# Patient Record
Sex: FEMALE | Race: WHITE | Employment: OTHER | URBAN - METROPOLITAN AREA
[De-identification: names, ages, dates, MRNs, and addresses within clinical notes are randomized per-mention and may not be internally consistent; named-entity substitution may affect disease eponyms.]

---

## 2017-01-14 PROCEDURE — 82656 EL-1 FECAL QUAL/SEMIQ: CPT

## 2017-01-14 PROCEDURE — 82705 FATS/LIPIDS FECES QUAL: CPT

## 2017-01-15 ENCOUNTER — LAB ENCOUNTER (OUTPATIENT)
Dept: LAB | Facility: HOSPITAL | Age: 72
End: 2017-01-15
Attending: INTERNAL MEDICINE
Payer: MEDICARE

## 2017-01-15 DIAGNOSIS — K86.89 PANCREATIC INSUFFICIENCY: ICD-10-CM

## 2017-01-18 LAB
NEUTRAL FAT, FECAL: NORMAL
SPLIT FAT, FECAL: NORMAL

## 2017-01-20 LAB — PANCREATIC ELASTASE , FECAL: 415 UG/G

## 2017-05-04 PROBLEM — Z86.010 HISTORY OF COLONIC POLYPS: Status: ACTIVE | Noted: 2017-05-04

## 2017-05-04 PROBLEM — Z86.0100 HISTORY OF COLONIC POLYPS: Status: ACTIVE | Noted: 2017-05-04

## 2017-08-15 ENCOUNTER — TELEPHONE (OUTPATIENT)
Dept: NEUROLOGY | Facility: CLINIC | Age: 72
End: 2017-08-15

## 2017-08-15 NOTE — TELEPHONE ENCOUNTER
Patients ENT doctor ordered a MRI for the face and neck and was wondering if Dr. Abbey Tenorio would be willing to combine an order of a MRI of the head. The patient has an MRI scheduled at [facility].     Informed patient there are no orders for a recent MRI

## 2017-09-06 ENCOUNTER — OFFICE VISIT (OUTPATIENT)
Dept: NEUROLOGY | Facility: CLINIC | Age: 72
End: 2017-09-06

## 2017-09-06 VITALS
BODY MASS INDEX: 28.95 KG/M2 | DIASTOLIC BLOOD PRESSURE: 76 MMHG | HEART RATE: 78 BPM | HEIGHT: 68 IN | RESPIRATION RATE: 16 BRPM | SYSTOLIC BLOOD PRESSURE: 132 MMHG | WEIGHT: 191 LBS

## 2017-09-06 DIAGNOSIS — R41.3 MEMORY LOSS: Primary | ICD-10-CM

## 2017-09-06 DIAGNOSIS — S06.0X0D CONCUSSION WITHOUT LOSS OF CONSCIOUSNESS, SUBSEQUENT ENCOUNTER: ICD-10-CM

## 2017-09-06 PROCEDURE — 99213 OFFICE O/P EST LOW 20 MIN: CPT | Performed by: OTHER

## 2017-09-06 RX ORDER — SIMVASTATIN 10 MG
TABLET ORAL
COMMUNITY
Start: 2017-08-31 | End: 2019-07-09

## 2017-09-06 RX ORDER — LEVOTHYROXINE SODIUM 0.15 MG/1
TABLET ORAL
COMMUNITY
Start: 2017-08-31 | End: 2019-05-06

## 2017-09-06 RX ORDER — MOMETASONE 50 UG/1
SPRAY, METERED NASAL
COMMUNITY
End: 2021-08-10 | Stop reason: ALTCHOICE

## 2017-09-06 NOTE — PATIENT INSTRUCTIONS
Refill policies:    • Allow 2-3 business days for refills; controlled substances may take longer.   • Contact your pharmacy at least 5 days prior to running out of medication and have them send an electronic request or submit request through the [facility] have a procedure or additional testing performed. [facility]) will contact your insurance carrier to obtain pre-certification or prior authorization.     Unfortunately, NICOLE has seen an increase in denial of payment even though the p

## 2017-09-09 NOTE — PROGRESS NOTES
HPI:    Patient ID: Nara Logan is a 80year old female. HPI      Ms Jeevan Leslie is a 80year old female who presented with complaints of memory problem. She has history of head concussion 2 years ago and had seen me for post concussion headache.  Sh Tab  Disp:  Rfl:    Mometasone Furoate 50 MCG/ACT Nasal Suspension Place 1 Puff in the nose two times per day. Disp:  Rfl:    Sertraline HCl 50 MG Oral Tab 50 mg. Disp:  Rfl:    Sertraline HCl 100 MG Oral Tab Take 100 mg by mouth daily.  Disp:  Rfl:    Lamo encounter    Recommend neuropsychological evaluation. All previous scan have been fine and recently had MRI neck and no intracranial abnormality seen on the available images    Follow up after the test. Continue antidepressant treatment.     See orders and

## 2018-01-08 PROBLEM — S63.631A SPRAIN OF INTERPHALANGEAL JOINT OF LEFT INDEX FINGER, INITIAL ENCOUNTER: Status: ACTIVE | Noted: 2018-01-08

## 2018-01-08 PROBLEM — M19.041 OSTEOARTHRITIS OF FINGER OF RIGHT HAND: Status: ACTIVE | Noted: 2018-01-08

## 2018-05-03 ENCOUNTER — HOSPITAL (OUTPATIENT)
Dept: OTHER | Age: 73
End: 2018-05-03
Attending: ORTHOPAEDIC SURGERY

## 2019-04-26 ENCOUNTER — TELEPHONE (OUTPATIENT)
Dept: NEUROLOGY | Facility: CLINIC | Age: 74
End: 2019-04-26

## 2019-05-06 ENCOUNTER — TELEPHONE (OUTPATIENT)
Dept: NEUROLOGY | Facility: CLINIC | Age: 74
End: 2019-05-06

## 2019-05-06 ENCOUNTER — HOSPITAL ENCOUNTER (OUTPATIENT)
Dept: GENERAL RADIOLOGY | Facility: HOSPITAL | Age: 74
Discharge: HOME | End: 2019-05-06
Attending: Other
Payer: MEDICARE

## 2019-05-06 ENCOUNTER — PATIENT MESSAGE (OUTPATIENT)
Dept: NEUROLOGY | Facility: CLINIC | Age: 74
End: 2019-05-06

## 2019-05-06 ENCOUNTER — OFFICE VISIT (OUTPATIENT)
Dept: NEUROLOGY | Facility: CLINIC | Age: 74
End: 2019-05-06
Payer: MEDICARE

## 2019-05-06 VITALS
HEART RATE: 90 BPM | WEIGHT: 176 LBS | RESPIRATION RATE: 14 BRPM | BODY MASS INDEX: 27 KG/M2 | SYSTOLIC BLOOD PRESSURE: 118 MMHG | DIASTOLIC BLOOD PRESSURE: 68 MMHG

## 2019-05-06 DIAGNOSIS — M54.81 OCCIPITAL NEURALGIA OF LEFT SIDE: ICD-10-CM

## 2019-05-06 DIAGNOSIS — G44.52 NEW DAILY PERSISTENT HEADACHE: ICD-10-CM

## 2019-05-06 DIAGNOSIS — G44.52 NEW DAILY PERSISTENT HEADACHE: Primary | ICD-10-CM

## 2019-05-06 PROCEDURE — 99214 OFFICE O/P EST MOD 30 MIN: CPT | Performed by: OTHER

## 2019-05-06 PROCEDURE — 96372 THER/PROPH/DIAG INJ SC/IM: CPT | Performed by: OTHER

## 2019-05-06 PROCEDURE — 72050 X-RAY EXAM NECK SPINE 4/5VWS: CPT | Performed by: OTHER

## 2019-05-06 RX ORDER — METHYLPHENIDATE HYDROCHLORIDE 20 MG/1
20 TABLET ORAL DAILY
Refills: 0 | COMMUNITY
Start: 2019-04-29 | End: 2019-05-06

## 2019-05-06 RX ORDER — LEVOTHYROXINE SODIUM 137 UG/1
TABLET ORAL
COMMUNITY
Start: 2019-04-25

## 2019-05-06 RX ORDER — HYDROCODONE BITARTRATE AND ACETAMINOPHEN 10; 325 MG/1; MG/1
1-2 TABLET ORAL
COMMUNITY
Start: 2019-03-19 | End: 2019-05-15 | Stop reason: ALTCHOICE

## 2019-05-06 RX ORDER — METHYLPHENIDATE HYDROCHLORIDE 20 MG/1
20 TABLET ORAL DAILY
COMMUNITY
Start: 2019-01-23

## 2019-05-06 RX ORDER — KETOROLAC TROMETHAMINE 30 MG/ML
30 INJECTION, SOLUTION INTRAMUSCULAR; INTRAVENOUS ONCE
Status: COMPLETED | OUTPATIENT
Start: 2019-05-06 | End: 2019-05-06

## 2019-05-06 RX ORDER — METHOCARBAMOL 500 MG/1
500 TABLET, FILM COATED ORAL 2 TIMES DAILY PRN
Qty: 60 TABLET | Refills: 2 | Status: SHIPPED | OUTPATIENT
Start: 2019-05-06 | End: 2021-08-10 | Stop reason: ALTCHOICE

## 2019-05-06 RX ORDER — ZOLPIDEM TARTRATE 10 MG/1
10 TABLET ORAL
COMMUNITY

## 2019-05-06 RX ADMIN — KETOROLAC TROMETHAMINE 30 MG: 30 INJECTION, SOLUTION INTRAMUSCULAR; INTRAVENOUS at 11:30:00

## 2019-05-06 NOTE — PROGRESS NOTES
HPI:    Patient ID: Denise Giron is a 68year old female. HPI   Patient presents for evaluation of headaches. She has previously seen me for memory issues and neuropsychology evaluation is still pending.  She reports in March she underwent parotid Negative. HENT: Negative. Eyes: Negative. Negative for visual disturbance. Respiratory: Negative. Cardiovascular: Negative. Gastrointestinal: Negative. Endocrine: Negative. Genitourinary: Negative. Musculoskeletal: Negative.     Aleksander Grijalva Normocephalic, atraumatic + occipital tenderness in left occipital area  NECK: restricted extension and lateral flexion  Cardiovascular: Normal rate, regular rhythm and normal heart sounds.     Pulmonary/Chest: Effort normal and breath sounds normal.   Abdo [place]          No orders of the defined types were placed in this encounter.       Meds This Visit:  Requested Prescriptions      No prescriptions requested or ordered in this encounter       Imaging & Referrals:  None       QR#[id number]

## 2019-05-06 NOTE — PROGRESS NOTES
The patient was given a Toradol injection today. Patient tolerated the medication well. The patient has no complaints.

## 2019-05-06 NOTE — PROGRESS NOTES
PT states she has had a headache since 04/12/2019 rating pain at 8/10 and states the headaches are incapaitating. PT reports no other symptoms that occur with the headaches.

## 2019-05-07 ENCOUNTER — PATIENT MESSAGE (OUTPATIENT)
Dept: NEUROLOGY | Facility: CLINIC | Age: 74
End: 2019-05-07

## 2019-05-07 ENCOUNTER — TELEPHONE (OUTPATIENT)
Dept: NEUROLOGY | Facility: CLINIC | Age: 74
End: 2019-05-07

## 2019-05-07 RX ORDER — METHYLPREDNISOLONE 4 MG/1
TABLET ORAL
Qty: 1 PACKAGE | Refills: 0 | Status: SHIPPED | OUTPATIENT
Start: 2019-05-07 | End: 2019-05-15 | Stop reason: ALTCHOICE

## 2019-05-07 NOTE — TELEPHONE ENCOUNTER
Patient informed of below. Patient states she has taken 4 Robaxin within a 24 hour period with no relief. Patient states she has had relief in the past with Flexeril and wondering if she can switch back to this.  States she has been taking Ibuprofen/Aleve w

## 2019-05-07 NOTE — TELEPHONE ENCOUNTER
1. Degenerative disc disease, facet hypertrophy and foraminal narrowing seen C3-4, C5-6 and C6-7 levels  2. Curvature straightening consistent with spasm.     Continue Robaxin, may take up to 3 times as needed and if tolerating  Continue NSAID prn for p

## 2019-05-07 NOTE — TELEPHONE ENCOUNTER
From: Lizette Philippe  To: Mehnaz Phipps MD  Sent: 5/7/2019 10:29 AM CDT  Subject: Visit Follow-up Question    Loc, you prescribed Methocarbamol, 500mg one tablet twice a day. I took one last night and one first thing this morning.  I don’t feel

## 2019-05-07 NOTE — TELEPHONE ENCOUNTER
She may need a steroid pack to decrease the neck inflammation. I placed the order.  She would need a dedicated MRI cervical spine for further evaluation and depending upon how severe DJD either spine surgery or pain medicine

## 2019-05-07 NOTE — TELEPHONE ENCOUNTER
From: Lizette Cisse  To:  Rene Jorge MD  Sent: 5/6/2019 3:00 PM CDT  Subject: Visit Follow-up Question    It’s 3 pm May 6, 2019  The Toradol injection has had no effect   I’ve just taken one 500mg Methocarbamol  I’ll let you know if that helps

## 2019-05-08 ENCOUNTER — TELEPHONE (OUTPATIENT)
Dept: NEUROLOGY | Facility: CLINIC | Age: 74
End: 2019-05-08

## 2019-05-08 NOTE — TELEPHONE ENCOUNTER
Spoke with patient and she states she will try the medrol dose clayton, but wants to move forward with the occipital nerve block and possible Trigger point injections. Patient reports that she has been taking 3 grams per day of the Methocarbamol (Robaxin).

## 2019-05-09 ENCOUNTER — TELEPHONE (OUTPATIENT)
Dept: NEUROLOGY | Facility: CLINIC | Age: 74
End: 2019-05-09

## 2019-05-09 DIAGNOSIS — M54.2 NECK PAIN: ICD-10-CM

## 2019-05-09 DIAGNOSIS — M54.81 OCCIPITAL NEURALGIA OF LEFT SIDE: Primary | ICD-10-CM

## 2019-05-09 NOTE — TELEPHONE ENCOUNTER
Per Dr. Gabriela Thomas, patient to come in for TPI and Occiptial Nerve Block. CPT codes as follows;    46726 Occipital Nerve Block  46392 TPI 3 or more muscles  59876 TPI 2 or more muscles    NICOLE other referral placed.

## 2019-05-10 ENCOUNTER — OFFICE VISIT (OUTPATIENT)
Dept: NEUROLOGY | Facility: CLINIC | Age: 74
End: 2019-05-10
Payer: MEDICARE

## 2019-05-10 VITALS
DIASTOLIC BLOOD PRESSURE: 76 MMHG | WEIGHT: 176 LBS | SYSTOLIC BLOOD PRESSURE: 136 MMHG | HEART RATE: 84 BPM | BODY MASS INDEX: 27 KG/M2 | RESPIRATION RATE: 16 BRPM

## 2019-05-10 DIAGNOSIS — M47.22 OSTEOARTHRITIS OF SPINE WITH RADICULOPATHY, CERVICAL REGION: ICD-10-CM

## 2019-05-10 DIAGNOSIS — M54.2 NECK PAIN: ICD-10-CM

## 2019-05-10 DIAGNOSIS — M54.81 OCCIPITAL NEURALGIA OF LEFT SIDE: ICD-10-CM

## 2019-05-10 DIAGNOSIS — G44.52 NEW DAILY PERSISTENT HEADACHE: ICD-10-CM

## 2019-05-10 DIAGNOSIS — M79.18 MYOFASCIAL PAIN SYNDROME, CERVICAL: ICD-10-CM

## 2019-05-10 PROCEDURE — 20553 NJX 1/MLT TRIGGER POINTS 3/>: CPT | Performed by: OTHER

## 2019-05-10 RX ORDER — TRIAMCINOLONE ACETONIDE 40 MG/ML
40 INJECTION, SUSPENSION INTRA-ARTICULAR; INTRAMUSCULAR ONCE
Status: COMPLETED | OUTPATIENT
Start: 2019-05-10 | End: 2019-05-10

## 2019-05-10 RX ORDER — BUPIVACAINE HYDROCHLORIDE 5 MG/ML
4 INJECTION, SOLUTION PERINEURAL ONCE
Status: COMPLETED | OUTPATIENT
Start: 2019-05-10 | End: 2019-05-10

## 2019-05-10 NOTE — PROGRESS NOTES
Patient here for occipital nerve block and trigger point injections. Risk vs benefit discussed and locations identified and shown to the patient.  She has b/l scars in the occipital area from parotid surgery so we will have to avoid the incision area so i

## 2019-05-10 NOTE — PROCEDURES
Indication:  Bilateral occipital neuralgia and trapezial pain/myofascial pain    Procedure: The areas are prepped and cleaned with betadine scrub and alcohol.   Bilateral occipital nerve blocks and trigger point injections in the left upper paracervical mus

## 2019-05-13 ENCOUNTER — TELEPHONE (OUTPATIENT)
Dept: NEUROLOGY | Facility: CLINIC | Age: 74
End: 2019-05-13

## 2019-05-13 DIAGNOSIS — M54.2 ACUTE NECK PAIN: Primary | ICD-10-CM

## 2019-05-13 NOTE — TELEPHONE ENCOUNTER
Pt called to FU on MRI. She states that  said she would order MRI of injection did not work. I told pt that this message is with the nurse who will FU with the Dr to confirm MRI order and call pt back with answer.

## 2019-05-13 NOTE — TELEPHONE ENCOUNTER
Called patient back and she states that at [address] [place] [address] on 05/06/2019, Dr. Daisy Roman stated she would order an MRI if Nerve Block and TPI did not work. Per patient, she is \"in horrible pain\" and would like MRI ordered before she sees pain management.     Routed to

## 2019-05-13 NOTE — TELEPHONE ENCOUNTER
Told Outpatient that I did not see an order for an MRI. Reviewed last OV notes and didn't see that MRI was discussed. Told Outpatient Rep that I would forward conversation to Nurse.   Have Nurse reconfirm that MRI has not been ordered and have nurse call

## 2019-05-14 NOTE — TELEPHONE ENCOUNTER
Called patient and informed her that Dr. Manav Lynne has ordered MRI for patient. Patient states she has the number for central scheduling. Patient denies any further needs at this time. Encouraged patient to call office for any questions or concerns.  Nelly

## 2019-05-15 ENCOUNTER — OFFICE VISIT (OUTPATIENT)
Dept: PAIN CLINIC | Facility: CLINIC | Age: 74
End: 2019-05-15
Payer: MEDICARE

## 2019-05-15 ENCOUNTER — HOSPITAL ENCOUNTER (OUTPATIENT)
Dept: MRI IMAGING | Age: 74
Discharge: HOME | End: 2019-05-15
Attending: Other
Payer: MEDICARE

## 2019-05-15 ENCOUNTER — TELEPHONE (OUTPATIENT)
Dept: NEUROLOGY | Facility: CLINIC | Age: 74
End: 2019-05-15

## 2019-05-15 VITALS — BODY MASS INDEX: 25.01 KG/M2 | OXYGEN SATURATION: 98 % | HEART RATE: 98 BPM | HEIGHT: 68 IN | WEIGHT: 165 LBS

## 2019-05-15 DIAGNOSIS — M54.81 BILATERAL OCCIPITAL NEURALGIA: ICD-10-CM

## 2019-05-15 DIAGNOSIS — M47.812 FACET ARTHROPATHY, CERVICAL: Primary | ICD-10-CM

## 2019-05-15 DIAGNOSIS — M54.2 ACUTE NECK PAIN: ICD-10-CM

## 2019-05-15 PROCEDURE — 99214 OFFICE O/P EST MOD 30 MIN: CPT | Performed by: NURSE PRACTITIONER

## 2019-05-15 PROCEDURE — 72141 MRI NECK SPINE W/O DYE: CPT | Performed by: OTHER

## 2019-05-15 RX ORDER — HYDROCODONE BITARTRATE AND ACETAMINOPHEN 10; 325 MG/1; MG/1
1 TABLET ORAL EVERY 8 HOURS PRN
Qty: 21 TABLET | Refills: 0 | Status: SHIPPED | OUTPATIENT
Start: 2019-05-15 | End: 2021-08-10 | Stop reason: ALTCHOICE

## 2019-05-15 NOTE — PATIENT INSTRUCTIONS
Refill policies:    • Allow 2-3 business days for refills; controlled substances may take longer.   • Contact your pharmacy at least 5 days prior to running out of medication and have them send an electronic request or submit request through the “request re been approved by your insurer. Depending on your insurance carrier, approval may take 3-10 days. It is highly recommended patients contact their insurance carrier directly to determine coverage.   If test is done without insurance authorization, patient ma SEDATION    Appointment Date: 5/20/19 and 5/29/19  Check-In Time: 10:30am and 10:00am      ** TO AVOID CANCELLATION AND/OR RESCHEDULING: PLEASE CALL [facility] PRE-PROCEDURE LINE -[phone] FOR DETAILED INSTRUCTIONS FIVE TO SEVEN DAYS PRIOR TO PROCEDURE** (Clopidogrel)  • Epidural ____ - 10 days  • Others 7 days   NSAIDs: 24 hours    • Ibuprofen (Motrin, Advil, Vicoprofen), Naproxen (Naprosyn, Aleve), Piroxcam (Feldene), Meloxicam (Mobic), Oxaprozin (Daypro), Diclofenac (Voltaren),  Indomethacin (Indocin), medication, or steroid into the facet joints. The facet joints are part of the bony framework of the spine. They are small bony projections from one vertebra meeting with similar bony projections from the vertebra above or below.  Sometimes, due to a variet their stomach. All patients receiving sedation are monitored with EKG, blood pressure cuff and oxygen monitoring device. Patients not receiving sedation are monitored as needed.  The skin of the neck or back is cleaned with antiseptic solution and numbed wi predict how helpful injections will be. Generally, patients who have the symptoms described above will do well. However, since there are several pain generators in the spine, the degree of response will vary widely. What are the risks and side effects?   Zack Wiggins

## 2019-05-15 NOTE — TELEPHONE ENCOUNTER
Spoke with pt, relayed test results. Addressed any concerns. Pt instructed to take prescribed medication as instructed. Pt verbalized understanding.  Encouraged pt to call office for any further questions.     ----- Message from Howie Davis MD sent at

## 2019-05-15 NOTE — PROGRESS NOTES
Spoke with patient and scheduled injections. Reviewed pre-op instructions. Patient verbalized understanding, no further questions at this time. Pre-op instructions given to patient.

## 2019-05-15 NOTE — H&P
Name: Luis Donohue   : 1945   DOS: 5/15/2019     Chief complaint: Patient presents with:  New Patient: recently completed medrol pack, did not experience relief. Head and neck.        History of present illness:  Lizette Marquez is a 68 Multiple Vitamins-Minerals (SENIOR MULTIVITAMIN PLUS OR) Take  by mouth. Disp:  Rfl:    HYDROcodone-acetaminophen  MG Oral Tab Take 1 tablet by mouth every 8 (eight) hours as needed for Pain.  Disp: 21 tablet Rfl: 0   Zolpidem Tartrate (AMBIEN) 10 M constipation, diarrhea, symptoms of GI bleeding. Neurologic: Negative. Specifically denies any fainting, head injury, LOC, weakness, tremor, headaches, seizures, speech disorders, loss of balance or any  other neurologic problems.   Genitourinary:  Denies left foraminal narrowing. C4-C5:  Trace posterior disc osteophyte complex with severe right and moderate left facet and uncinate hypertrophy. No spinal canal stenosis. Mild bilateral foraminal narrowing.      C5-C6:  Mild posterior disc osteophyte com types were placed in this encounter.       Medications filled today:  Requested Prescriptions     Signed Prescriptions Disp Refills   • HYDROcodone-acetaminophen  MG Oral Tab 21 tablet 0     Sig: Take 1 tablet by mouth every 8 (eight) hours as needed

## 2019-05-20 ENCOUNTER — HOSPITAL ENCOUNTER (OUTPATIENT)
Facility: HOSPITAL | Age: 74
Setting detail: HOSPITAL OUTPATIENT SURGERY
Discharge: HOME | End: 2019-05-20
Attending: ANESTHESIOLOGY | Admitting: ANESTHESIOLOGY
Payer: MEDICARE

## 2019-05-20 ENCOUNTER — APPOINTMENT (OUTPATIENT)
Dept: GENERAL RADIOLOGY | Facility: HOSPITAL | Age: 74
End: 2019-05-20
Attending: ANESTHESIOLOGY
Payer: MEDICARE

## 2019-05-20 VITALS
TEMPERATURE: 98 F | HEART RATE: 67 BPM | DIASTOLIC BLOOD PRESSURE: 70 MMHG | SYSTOLIC BLOOD PRESSURE: 141 MMHG | RESPIRATION RATE: 13 BRPM | OXYGEN SATURATION: 100 %

## 2019-05-20 DIAGNOSIS — M47.812 FACET ARTHROPATHY, CERVICAL: ICD-10-CM

## 2019-05-20 PROCEDURE — 3E0U33Z INTRODUCTION OF ANTI-INFLAMMATORY INTO JOINTS, PERCUTANEOUS APPROACH: ICD-10-PCS | Performed by: ANESTHESIOLOGY

## 2019-05-20 RX ORDER — DEXAMETHASONE SODIUM PHOSPHATE 10 MG/ML
INJECTION, SOLUTION INTRAMUSCULAR; INTRAVENOUS AS NEEDED
Status: DISCONTINUED | OUTPATIENT
Start: 2019-05-20 | End: 2019-05-20

## 2019-05-20 RX ORDER — ONDANSETRON 2 MG/ML
4 INJECTION INTRAMUSCULAR; INTRAVENOUS ONCE AS NEEDED
Status: CANCELLED | OUTPATIENT
Start: 2019-05-20 | End: 2019-05-20

## 2019-05-20 RX ORDER — DIPHENHYDRAMINE HYDROCHLORIDE 50 MG/ML
50 INJECTION INTRAMUSCULAR; INTRAVENOUS ONCE AS NEEDED
Status: CANCELLED | OUTPATIENT
Start: 2019-05-20 | End: 2019-05-20

## 2019-05-20 RX ORDER — 0.9 % SODIUM CHLORIDE 0.9 %
VIAL (ML) INJECTION AS NEEDED
Status: DISCONTINUED | OUTPATIENT
Start: 2019-05-20 | End: 2019-05-20

## 2019-05-20 RX ORDER — LIDOCAINE HYDROCHLORIDE 10 MG/ML
INJECTION, SOLUTION EPIDURAL; INFILTRATION; INTRACAUDAL; PERINEURAL AS NEEDED
Status: DISCONTINUED | OUTPATIENT
Start: 2019-05-20 | End: 2019-05-20

## 2019-05-20 RX ORDER — SODIUM CHLORIDE, SODIUM LACTATE, POTASSIUM CHLORIDE, CALCIUM CHLORIDE 600; 310; 30; 20 MG/100ML; MG/100ML; MG/100ML; MG/100ML
100 INJECTION, SOLUTION INTRAVENOUS CONTINUOUS
Status: DISCONTINUED | OUTPATIENT
Start: 2019-05-20 | End: 2019-05-20

## 2019-05-20 RX ORDER — ONDANSETRON 2 MG/ML
4 INJECTION INTRAMUSCULAR; INTRAVENOUS ONCE AS NEEDED
Status: DISCONTINUED | OUTPATIENT
Start: 2019-05-20 | End: 2019-05-20

## 2019-05-20 NOTE — OPERATIVE REPORT
[facility]  Operative Report  2019     Lizette Fagan Patient Status:  Hospital Outpatient Surgery    1945 MRN [id number]   [facility] ENDOSCOPY Attending Sindy Hernandes MD   Hosp Day # 0 PCP Claribel Mcdnoald complications were encountered during or immediately after the procedure. The patient was observed until discharge criteria met. Discharge instructions were given and patient was released to a responsible adult. Complications: None. Follow up:  In

## 2019-05-20 NOTE — H&P
History & Physical Examination    Patient Name: Yessenia Flores  MRN: [id number]  [facility]: [id number]  YOB: 1945    Pre-Operative Diagnosis:  Facet arthropathy, cervical [M47.812]    Present Illness: Patient with neck pain here for cervical f History   Problem Relation Age of Onset   • Cancer Father         lung cancer   • Heart Disease Father    • High Blood Pressure Father    • Other (Other) Father         TB   • Heart Disease Mother    • Cancer Mother         leukemia   • Glaucoma Mother

## 2019-05-21 ENCOUNTER — TELEPHONE (OUTPATIENT)
Dept: PAIN CLINIC | Facility: CLINIC | Age: 74
End: 2019-05-21

## 2019-05-21 DIAGNOSIS — M47.812 ARTHROPATHY OF CERVICAL FACET JOINT: Primary | ICD-10-CM

## 2019-05-22 NOTE — TELEPHONE ENCOUNTER
Attempted call yesterday evening to patient. LM informing patient call back would be given. Reached out to patient this AM to discuss patient recent visit for injection with Dr. Concha Cha.   Pt states she had edited discussion with Renita Vann, Patient advoc

## 2019-05-23 ENCOUNTER — TELEPHONE (OUTPATIENT)
Dept: SURGERY | Facility: CLINIC | Age: 74
End: 2019-05-23

## 2019-05-23 NOTE — TELEPHONE ENCOUNTER
Spoke to patient, confirmed procedure date of 5/29/19 and to be checked in at outpatient registration at 10:00 am. Patient instructed to call pre-procedure line before procedure at [phone].  Patient instructed to call office if there are additional que

## 2019-05-29 ENCOUNTER — APPOINTMENT (OUTPATIENT)
Dept: GENERAL RADIOLOGY | Facility: HOSPITAL | Age: 74
End: 2019-05-29
Attending: ANESTHESIOLOGY
Payer: MEDICARE

## 2019-05-29 ENCOUNTER — HOSPITAL ENCOUNTER (OUTPATIENT)
Facility: HOSPITAL | Age: 74
Setting detail: HOSPITAL OUTPATIENT SURGERY
Discharge: HOME | End: 2019-05-29
Attending: ANESTHESIOLOGY | Admitting: ANESTHESIOLOGY
Payer: MEDICARE

## 2019-05-29 VITALS
SYSTOLIC BLOOD PRESSURE: 100 MMHG | TEMPERATURE: 98 F | OXYGEN SATURATION: 100 % | RESPIRATION RATE: 20 BRPM | HEART RATE: 67 BPM | DIASTOLIC BLOOD PRESSURE: 80 MMHG

## 2019-05-29 DIAGNOSIS — M47.812 ARTHROPATHY OF CERVICAL FACET JOINT: ICD-10-CM

## 2019-05-29 PROCEDURE — 3E0U33Z INTRODUCTION OF ANTI-INFLAMMATORY INTO JOINTS, PERCUTANEOUS APPROACH: ICD-10-PCS | Performed by: ANESTHESIOLOGY

## 2019-05-29 RX ORDER — ONDANSETRON 2 MG/ML
4 INJECTION INTRAMUSCULAR; INTRAVENOUS ONCE AS NEEDED
Status: DISCONTINUED | OUTPATIENT
Start: 2019-05-29 | End: 2019-05-29

## 2019-05-29 RX ORDER — DIPHENHYDRAMINE HYDROCHLORIDE 50 MG/ML
50 INJECTION INTRAMUSCULAR; INTRAVENOUS ONCE AS NEEDED
Status: DISCONTINUED | OUTPATIENT
Start: 2019-05-29 | End: 2019-05-29

## 2019-05-29 RX ORDER — DEXAMETHASONE SODIUM PHOSPHATE 10 MG/ML
INJECTION, SOLUTION INTRAMUSCULAR; INTRAVENOUS AS NEEDED
Status: DISCONTINUED | OUTPATIENT
Start: 2019-05-29 | End: 2019-05-29

## 2019-05-29 RX ORDER — SODIUM CHLORIDE, SODIUM LACTATE, POTASSIUM CHLORIDE, CALCIUM CHLORIDE 600; 310; 30; 20 MG/100ML; MG/100ML; MG/100ML; MG/100ML
100 INJECTION, SOLUTION INTRAVENOUS CONTINUOUS
Status: DISCONTINUED | OUTPATIENT
Start: 2019-05-29 | End: 2019-05-29

## 2019-05-29 RX ORDER — LIDOCAINE HYDROCHLORIDE 10 MG/ML
INJECTION, SOLUTION EPIDURAL; INFILTRATION; INTRACAUDAL; PERINEURAL AS NEEDED
Status: DISCONTINUED | OUTPATIENT
Start: 2019-05-29 | End: 2019-05-29

## 2019-05-29 RX ORDER — 0.9 % SODIUM CHLORIDE 0.9 %
VIAL (ML) INJECTION AS NEEDED
Status: DISCONTINUED | OUTPATIENT
Start: 2019-05-29 | End: 2019-05-29

## 2019-05-29 NOTE — OPERATIVE REPORT
[facility]  Operative Report  2019     Lizette Urbano Patient Status:  Hospital Outpatient Surgery    1945 MRN [id number]   [facility] ENDOSCOPY Attending Josr Weir MD   Hosp Day # 0 JENNA Osorio complications were encountered during or immediately after the procedure. The patient was observed until discharge criteria met. Discharge instructions were given and patient was released to a responsible adult. Complications: None.     Follow up: Cl

## 2019-05-29 NOTE — H&P
History & Physical Examination    Patient Name: Ana Webb  MRN: [id number]  CSN: [id number]  YOB: 1945    Pre-Operative Diagnosis:  Arthropathy of cervical facet joint [M47.812]    Present Illness: Patient with cervical facet arthro gland removal   • TONSILLECTOMY       Family History   Problem Relation Age of Onset   • Cancer Father         lung cancer   • Heart Disease Father    • High Blood Pressure Father    • Other (Other) Father         TB   • Heart Disease Mother    • Cancer Mo

## 2019-05-30 ENCOUNTER — TELEPHONE (OUTPATIENT)
Dept: NEUROLOGY | Facility: CLINIC | Age: 74
End: 2019-05-30

## 2019-05-30 NOTE — TELEPHONE ENCOUNTER
patient has been seeing Dr. Fanny Noe (Pain mgmt) patient questioned whether she should wait another month or so before coming into see Dr. Litzy Guerra. Patient has appointment with Dr. Fanny Noe on 6/6   Please advise?

## 2019-06-03 NOTE — TELEPHONE ENCOUNTER
Per discussion with Dr. Sheree Braun, since patient has f/u with Dr. Vikki Tavarez, patient can reschedule with Dr. Sheree Braun for a later date. Patient states she would like to cancel appointment and she will call in to reschedule at a later date if needed.  Appointme

## 2019-06-06 ENCOUNTER — OFFICE VISIT (OUTPATIENT)
Dept: PAIN CLINIC | Facility: CLINIC | Age: 74
End: 2019-06-06
Payer: MEDICARE

## 2019-06-06 VITALS — HEIGHT: 68.5 IN | OXYGEN SATURATION: 97 % | WEIGHT: 165 LBS | HEART RATE: 86 BPM | BODY MASS INDEX: 24.72 KG/M2

## 2019-06-06 DIAGNOSIS — M54.2 NECK PAIN: Primary | ICD-10-CM

## 2019-06-06 PROCEDURE — 99213 OFFICE O/P EST LOW 20 MIN: CPT | Performed by: ANESTHESIOLOGY

## 2019-06-06 RX ORDER — GABAPENTIN 100 MG/1
100 CAPSULE ORAL 3 TIMES DAILY
Qty: 90 CAPSULE | Refills: 0 | Status: SHIPPED | OUTPATIENT
Start: 2019-06-06 | End: 2019-06-28

## 2019-06-06 RX ORDER — SIMVASTATIN 20 MG
TABLET ORAL
COMMUNITY
Start: 2019-05-23 | End: 2019-07-09

## 2019-06-06 RX ORDER — SIMVASTATIN 40 MG
TABLET ORAL
COMMUNITY
Start: 2019-06-03

## 2019-06-06 NOTE — PROGRESS NOTES
Name: Yessenia Flores   : 1945   DOS: 2019     Pain Clinic Follow Up Visit:   Patient presents with: Follow - Up      Lizette Simon Rodger is a 68year old female who presents today for follow-up after bilateral cervical facet injections. 60 tablet Rfl: 2         EXAM:   Pulse 86   Ht 68.5\"   Wt 165 lb   SpO2 97%   BMI 24.72 kg/m²   General:  Patient is a(n) 68year old year old female in no acute distress.   Neurologic[de-identified] WNL-Orientation to time, place and person, normal mood & affect, conc

## 2019-06-06 NOTE — PROGRESS NOTES
Patient here to f/u post LEFT CERVICAL FACET INJECTION X4 LEVELS (5/2919), RIGHT CERVICAL FACET INJECTION (5/20/19)      Relief reported: 20-25%     Current pain reported: 4/10    Pain location: middle of neck into top of head almost to forehead, does not

## 2019-06-28 RX ORDER — GABAPENTIN 100 MG/1
CAPSULE ORAL
Qty: 90 CAPSULE | Refills: 0 | Status: SHIPPED | OUTPATIENT
Start: 2019-06-28 | End: 2019-07-09 | Stop reason: DRUGHIGH

## 2019-07-09 ENCOUNTER — OFFICE VISIT (OUTPATIENT)
Dept: PAIN CLINIC | Facility: CLINIC | Age: 74
End: 2019-07-09
Payer: MEDICARE

## 2019-07-09 VITALS
OXYGEN SATURATION: 98 % | HEIGHT: 68.5 IN | WEIGHT: 162 LBS | BODY MASS INDEX: 24.27 KG/M2 | HEART RATE: 86 BPM | DIASTOLIC BLOOD PRESSURE: 74 MMHG | SYSTOLIC BLOOD PRESSURE: 118 MMHG

## 2019-07-09 DIAGNOSIS — M54.2 NECK PAIN: Primary | ICD-10-CM

## 2019-07-09 PROCEDURE — 99213 OFFICE O/P EST LOW 20 MIN: CPT | Performed by: ANESTHESIOLOGY

## 2019-07-09 RX ORDER — GABAPENTIN 300 MG/1
300 CAPSULE ORAL 4 TIMES DAILY
Qty: 120 CAPSULE | Refills: 0 | Status: SHIPPED | OUTPATIENT
Start: 2019-07-09 | End: 2019-07-24

## 2019-07-09 RX ORDER — NAPROXEN SODIUM 220 MG
TABLET ORAL
COMMUNITY

## 2019-07-09 NOTE — PROGRESS NOTES
Patient presents in office today with reported pain in temple through skull to occipital region into middle of cervical spine per pt, behind ears    Current pain level reported = 4.5/10    Last reported dose of Hydrocodone-Acetaminophn  = last week

## 2019-07-09 NOTE — PROGRESS NOTES
Name: Shira Almendarez   : 1945   DOS: 2019     Pain Clinic Follow Up Visit:   Patient presents with:   Follow - Up      Lizette Amaro is a 68year old female with a history of chronic headache and neck pain following up to discuss med Tab Take 10 mg by mouth. Disp:  Rfl:    methocarbamol 500 MG Oral Tab Take 1 tablet (500 mg total) by mouth 2 (two) times daily as needed.  Disp: 60 tablet Rfl: 2         EXAM:   /74 (BP Location: Right arm, Patient Position: Sitting, Cuff Size: adult

## 2019-07-24 RX ORDER — GABAPENTIN 300 MG/1
CAPSULE ORAL
Qty: 120 CAPSULE | Refills: 0 | Status: SHIPPED | OUTPATIENT
Start: 2019-07-24 | End: 2019-08-27

## 2019-08-27 ENCOUNTER — OFFICE VISIT (OUTPATIENT)
Dept: PAIN CLINIC | Facility: CLINIC | Age: 74
End: 2019-08-27
Payer: MEDICARE

## 2019-08-27 VITALS
DIASTOLIC BLOOD PRESSURE: 68 MMHG | HEART RATE: 68 BPM | SYSTOLIC BLOOD PRESSURE: 100 MMHG | HEIGHT: 68.5 IN | BODY MASS INDEX: 26.22 KG/M2 | OXYGEN SATURATION: 98 % | WEIGHT: 175 LBS

## 2019-08-27 DIAGNOSIS — G89.29 CHRONIC NONINTRACTABLE HEADACHE, UNSPECIFIED HEADACHE TYPE: ICD-10-CM

## 2019-08-27 DIAGNOSIS — R51.9 HEADACHE, CHRONIC DAILY: Primary | ICD-10-CM

## 2019-08-27 DIAGNOSIS — R51.9 CHRONIC NONINTRACTABLE HEADACHE, UNSPECIFIED HEADACHE TYPE: ICD-10-CM

## 2019-08-27 PROCEDURE — 99214 OFFICE O/P EST MOD 30 MIN: CPT | Performed by: ANESTHESIOLOGY

## 2019-08-27 RX ORDER — GABAPENTIN 100 MG/1
100 CAPSULE ORAL 2 TIMES DAILY
Qty: 60 CAPSULE | Refills: 1 | Status: SHIPPED | OUTPATIENT
Start: 2019-08-27

## 2019-08-27 RX ORDER — GABAPENTIN 400 MG/1
400 CAPSULE ORAL 4 TIMES DAILY
Qty: 120 CAPSULE | Refills: 1 | Status: SHIPPED | OUTPATIENT
Start: 2019-08-27 | End: 2020-03-11

## 2019-08-27 RX ORDER — GABAPENTIN 100 MG/1
100 CAPSULE ORAL 3 TIMES DAILY
COMMUNITY
End: 2019-08-27

## 2019-08-27 NOTE — PROGRESS NOTES
Name: Charles Pimentel   : 1945   DOS: 2019     Pain Clinic Follow Up Visit:   Patient presents with:   Follow - Up      Lizette Christine Yudith is a 68year old female with a history of chronic daily headache, since facelift procedure here for HYDROcodone-acetaminophen  MG Oral Tab Take 1 tablet by mouth every 8 (eight) hours as needed for Pain. Disp: 21 tablet Rfl: 0   Zolpidem Tartrate (AMBIEN) 10 MG Oral Tab Take 10 mg by mouth.  Disp:  Rfl:    methocarbamol 500 MG Oral Tab Take 1 tab amount of time discussing her recent hospitalization, medications trialed, medication side effects, scar injections, acupuncture, and other treatment modalities.     Radiology orders and consultations:AC[place]UNCTURE [place] - INTERNAL REFERRAL  The patient

## 2019-08-27 NOTE — PROGRESS NOTES
Patient presents in office today with reported pain in forehead, cervical spine and on top of head. Daily headaches.     Current pain level reported = 5/10    Last reported dose of HYDROcodone-acetaminophen  MG Oral Tab = 6 Weeks ago    Patient would

## 2019-08-28 ENCOUNTER — OFFICE VISIT (OUTPATIENT)
Dept: INTEGRATIVE MEDICINE | Facility: CLINIC | Age: 74
End: 2019-08-28

## 2019-08-28 DIAGNOSIS — G89.29 CHRONIC NONINTRACTABLE HEADACHE, UNSPECIFIED HEADACHE TYPE: ICD-10-CM

## 2019-08-28 DIAGNOSIS — R51.9 CHRONIC NONINTRACTABLE HEADACHE, UNSPECIFIED HEADACHE TYPE: ICD-10-CM

## 2019-08-28 NOTE — PROGRESS NOTES
Shirin Malagon   Integrative Medicine          Initial Intake    Patient reports feeling persistent headaches that have evolved from the back of the head and move to the center of the head and forehead.  Level=8  The headaches limit her life in enjoyi

## 2019-08-30 ENCOUNTER — OFFICE VISIT (OUTPATIENT)
Dept: INTEGRATIVE MEDICINE | Facility: CLINIC | Age: 74
End: 2019-08-30

## 2019-08-30 DIAGNOSIS — G89.29 CHRONIC NONINTRACTABLE HEADACHE, UNSPECIFIED HEADACHE TYPE: ICD-10-CM

## 2019-08-30 DIAGNOSIS — R51.9 CHRONIC NONINTRACTABLE HEADACHE, UNSPECIFIED HEADACHE TYPE: ICD-10-CM

## 2019-08-30 NOTE — PROGRESS NOTES
Cecille Butcher, 1914 [address] was having sinus congestion that kept her with a dull headache,.   Initial Intake    Patient reports feeling persistent headaches that have evolved from the back of the head and move to the center o

## 2019-09-05 ENCOUNTER — HOSPITAL ENCOUNTER (EMERGENCY)
Facility: HOSPITAL | Age: 74
Discharge: HOME | End: 2019-09-05
Attending: EMERGENCY MEDICINE
Payer: MEDICARE

## 2019-09-05 VITALS
BODY MASS INDEX: 25.76 KG/M2 | RESPIRATION RATE: 18 BRPM | DIASTOLIC BLOOD PRESSURE: 59 MMHG | TEMPERATURE: 98 F | WEIGHT: 170 LBS | HEIGHT: 68 IN | SYSTOLIC BLOOD PRESSURE: 107 MMHG | OXYGEN SATURATION: 97 % | HEART RATE: 54 BPM

## 2019-09-05 DIAGNOSIS — G89.29 CHRONIC INTRACTABLE HEADACHE, UNSPECIFIED HEADACHE TYPE: Primary | ICD-10-CM

## 2019-09-05 DIAGNOSIS — R51.9 CHRONIC INTRACTABLE HEADACHE, UNSPECIFIED HEADACHE TYPE: Primary | ICD-10-CM

## 2019-09-05 LAB
ALBUMIN SERPL-MCNC: 3.3 G/DL (ref 3.4–5)
ALBUMIN/GLOB SERPL: 1.1 {RATIO} (ref 1–2)
ALP LIVER SERPL-CCNC: 60 U/L (ref 55–142)
ALT SERPL-CCNC: 25 U/L (ref 13–56)
ANION GAP SERPL CALC-SCNC: 5 MMOL/L (ref 0–18)
AST SERPL-CCNC: 26 U/L (ref 15–37)
BASOPHILS # BLD AUTO: 0.04 X10(3) UL (ref 0–0.2)
BASOPHILS NFR BLD AUTO: 1 %
BILIRUB SERPL-MCNC: 0.4 MG/DL (ref 0.1–2)
BUN BLD-MCNC: 29 MG/DL (ref 7–18)
BUN/CREAT SERPL: 26.1 (ref 10–20)
CALCIUM BLD-MCNC: 8.6 MG/DL (ref 8.5–10.1)
CHLORIDE SERPL-SCNC: 107 MMOL/L (ref 98–112)
CO2 SERPL-SCNC: 26 MMOL/L (ref 21–32)
CREAT BLD-MCNC: 1.11 MG/DL (ref 0.55–1.02)
DEPRECATED RDW RBC AUTO: 46.2 FL (ref 35.1–46.3)
EOSINOPHIL # BLD AUTO: 0.17 X10(3) UL (ref 0–0.7)
EOSINOPHIL NFR BLD AUTO: 4 %
ERYTHROCYTE [DISTWIDTH] IN BLOOD BY AUTOMATED COUNT: 13.7 % (ref 11–15)
GLOBULIN PLAS-MCNC: 3.1 G/DL (ref 2.8–4.4)
GLUCOSE BLD-MCNC: 120 MG/DL (ref 70–99)
HCT VFR BLD AUTO: 39.2 % (ref 35–48)
HGB BLD-MCNC: 13.1 G/DL (ref 12–16)
IMM GRANULOCYTES # BLD AUTO: 0.01 X10(3) UL (ref 0–1)
IMM GRANULOCYTES NFR BLD: 0.2 %
LYMPHOCYTES # BLD AUTO: 1.03 X10(3) UL (ref 1–4)
LYMPHOCYTES NFR BLD AUTO: 24.5 %
M PROTEIN MFR SERPL ELPH: 6.4 G/DL (ref 6.4–8.2)
MCH RBC QN AUTO: 30.7 PG (ref 26–34)
MCHC RBC AUTO-ENTMCNC: 33.4 G/DL (ref 31–37)
MCV RBC AUTO: 91.8 FL (ref 80–100)
MONOCYTES # BLD AUTO: 0.24 X10(3) UL (ref 0.1–1)
MONOCYTES NFR BLD AUTO: 5.7 %
NEUTROPHILS # BLD AUTO: 2.71 X10 (3) UL (ref 1.5–7.7)
NEUTROPHILS # BLD AUTO: 2.71 X10(3) UL (ref 1.5–7.7)
NEUTROPHILS NFR BLD AUTO: 64.6 %
OSMOLALITY SERPL CALC.SUM OF ELEC: 293 MOSM/KG (ref 275–295)
PLATELET # BLD AUTO: 157 10(3)UL (ref 150–450)
POTASSIUM SERPL-SCNC: 3.8 MMOL/L (ref 3.5–5.1)
RBC # BLD AUTO: 4.27 X10(6)UL (ref 3.8–5.3)
SODIUM SERPL-SCNC: 138 MMOL/L (ref 136–145)
WBC # BLD AUTO: 4.2 X10(3) UL (ref 4–11)

## 2019-09-05 PROCEDURE — 99284 EMERGENCY DEPT VISIT MOD MDM: CPT

## 2019-09-05 PROCEDURE — 85025 COMPLETE CBC W/AUTO DIFF WBC: CPT | Performed by: EMERGENCY MEDICINE

## 2019-09-05 PROCEDURE — 96374 THER/PROPH/DIAG INJ IV PUSH: CPT

## 2019-09-05 PROCEDURE — 80053 COMPREHEN METABOLIC PANEL: CPT | Performed by: EMERGENCY MEDICINE

## 2019-09-05 PROCEDURE — 99285 EMERGENCY DEPT VISIT HI MDM: CPT

## 2019-09-05 PROCEDURE — 96375 TX/PRO/DX INJ NEW DRUG ADDON: CPT

## 2019-09-05 RX ORDER — MORPHINE SULFATE 4 MG/ML
4 INJECTION, SOLUTION INTRAMUSCULAR; INTRAVENOUS ONCE
Status: COMPLETED | OUTPATIENT
Start: 2019-09-05 | End: 2019-09-05

## 2019-09-05 RX ORDER — HYDROMORPHONE HYDROCHLORIDE 1 MG/ML
0.5 INJECTION, SOLUTION INTRAMUSCULAR; INTRAVENOUS; SUBCUTANEOUS ONCE
Status: COMPLETED | OUTPATIENT
Start: 2019-09-05 | End: 2019-09-05

## 2019-09-05 RX ORDER — METHYLPREDNISOLONE 4 MG/1
TABLET ORAL
Qty: 1 PACKAGE | Refills: 0 | Status: SHIPPED | OUTPATIENT
Start: 2019-09-05 | End: 2021-08-10 | Stop reason: ALTCHOICE

## 2019-09-05 RX ORDER — KETOROLAC TROMETHAMINE 30 MG/ML
30 INJECTION, SOLUTION INTRAMUSCULAR; INTRAVENOUS ONCE
Status: COMPLETED | OUTPATIENT
Start: 2019-09-05 | End: 2019-09-05

## 2019-09-05 NOTE — ED PROVIDER NOTES
Patient Seen in: [facility] Emergency Department    History   Patient presents with:  Headache (neurologic)    Stated Complaint: headache/nausea    HPI    Patient is a 67-year-old female who states she has had a chronic intractable headache since sh Current:/52   Pulse 53   Temp 98 °F (36.7 °C) (Temporal)   Resp 18   Ht 172.7 cm (5' 8\")   Wt 77.1 kg   SpO2 99%   BMI 25.85 kg/m²         Physical Exam   Constitutional: She is oriented to person, place, and time.  She appears well-developed a has had a chronic daily headache since parotid surgery in March.   She has seen numerous providers and has been admitted to the [place] headache clinic [place] at [facility], she states with no real relief although per their notes in care everywhere she

## 2019-09-05 NOTE — ED NOTES
Patient educated on discharge instructions including new prescription. Encouraged follow up with neurology. Family at bedside for ride home. Patient provided with wheelchair as requested. IV discontinued intact.

## 2019-09-05 NOTE — ED INITIAL ASSESSMENT (HPI)
Pt had surgery on her salivary gland in march. Pt has been having intermittent severe headaches since. Pt states the last several days the headaches have been more severe with poor sleep and nausea. Denies visual disturbances at present.  Pt attempted accu

## 2019-09-06 ENCOUNTER — OFFICE VISIT (OUTPATIENT)
Dept: INTEGRATIVE MEDICINE | Facility: CLINIC | Age: 74
End: 2019-09-06

## 2019-09-06 DIAGNOSIS — M54.2 NECK PAIN: ICD-10-CM

## 2019-09-09 ENCOUNTER — OFFICE VISIT (OUTPATIENT)
Dept: INTEGRATIVE MEDICINE | Facility: CLINIC | Age: 74
End: 2019-09-09

## 2019-09-09 ENCOUNTER — TELEPHONE (OUTPATIENT)
Dept: SURGERY | Facility: CLINIC | Age: 74
End: 2019-09-09

## 2019-09-09 DIAGNOSIS — R51.9 CHRONIC NONINTRACTABLE HEADACHE, UNSPECIFIED HEADACHE TYPE: ICD-10-CM

## 2019-09-09 DIAGNOSIS — G89.29 CHRONIC NONINTRACTABLE HEADACHE, UNSPECIFIED HEADACHE TYPE: ICD-10-CM

## 2019-09-11 NOTE — PROGRESS NOTES
Brooks Bowles, 1914 [address] has been having a relief of 40% in her index finger and 20% better in the headaches.   Initial Intake    Patient reports feeling persistent headaches that have evolved from the back of the head and

## 2019-09-12 NOTE — PROGRESS NOTES
Dewitt Aase, 1914 [address] has been having a relief of 45% in her index finger and 20% better in the headaches.   Initial Intake    Patient reports feeling persistent headaches that have evolved from the back of the head and

## 2019-09-13 ENCOUNTER — OFFICE VISIT (OUTPATIENT)
Dept: INTEGRATIVE MEDICINE | Facility: CLINIC | Age: 74
End: 2019-09-13

## 2019-09-13 DIAGNOSIS — R51.9 CHRONIC NONINTRACTABLE HEADACHE, UNSPECIFIED HEADACHE TYPE: ICD-10-CM

## 2019-09-13 DIAGNOSIS — G89.29 CHRONIC NONINTRACTABLE HEADACHE, UNSPECIFIED HEADACHE TYPE: ICD-10-CM

## 2019-09-13 NOTE — PROGRESS NOTES
Dewitt Aase, 1914 [address] has been having a relief of 55% in her index finger and 25% better in the headaches.   Initial Intake    Patient reports feeling persistent headaches that have evolved from the back of the head and

## 2019-09-18 ENCOUNTER — OFFICE VISIT (OUTPATIENT)
Dept: INTEGRATIVE MEDICINE | Facility: CLINIC | Age: 74
End: 2019-09-18

## 2019-09-18 DIAGNOSIS — M54.2 NECK PAIN: ICD-10-CM

## 2019-09-18 NOTE — PROGRESS NOTES
Washington Rich, 1914 [address] has been having a relief of 60% in her index finger and 25% better in the headaches.   Initial Intake    Patient reports feeling persistent headaches that have evolved from the back of the head and

## 2019-09-20 ENCOUNTER — OFFICE VISIT (OUTPATIENT)
Dept: INTEGRATIVE MEDICINE | Facility: CLINIC | Age: 74
End: 2019-09-20

## 2019-09-20 DIAGNOSIS — M54.2 NECK PAIN: ICD-10-CM

## 2019-09-20 NOTE — PROGRESS NOTES
Anish Brandt, 1914 [address] has been having a relief of 60% in her index finger and 30% better in the headaches.   Initial Intake    Patient reports feeling persistent headaches that have evolved from the back of the head and

## 2019-09-25 ENCOUNTER — OFFICE VISIT (OUTPATIENT)
Dept: INTEGRATIVE MEDICINE | Facility: CLINIC | Age: 74
End: 2019-09-25

## 2019-09-25 DIAGNOSIS — G89.29 CHRONIC NONINTRACTABLE HEADACHE, UNSPECIFIED HEADACHE TYPE: ICD-10-CM

## 2019-09-25 DIAGNOSIS — R51.9 CHRONIC NONINTRACTABLE HEADACHE, UNSPECIFIED HEADACHE TYPE: ICD-10-CM

## 2019-09-25 NOTE — PROGRESS NOTES
Kiera Quiñonez   Integrative Medicine       Patient has been having a relief of 60% in her index finger and 40% better in the headaches he had a relief from Friday all the way to Sunday so she was very happy.     Initial Intake    Patient reports fee

## 2019-09-30 ENCOUNTER — OFFICE VISIT (OUTPATIENT)
Dept: INTEGRATIVE MEDICINE | Facility: CLINIC | Age: 74
End: 2019-09-30

## 2019-09-30 DIAGNOSIS — G89.29 CHRONIC NONINTRACTABLE HEADACHE, UNSPECIFIED HEADACHE TYPE: ICD-10-CM

## 2019-09-30 DIAGNOSIS — R51.9 CHRONIC NONINTRACTABLE HEADACHE, UNSPECIFIED HEADACHE TYPE: ICD-10-CM

## 2019-10-01 NOTE — PROGRESS NOTES
Kiera Quiñonez   Integrative Medicine       Patient has been having a relief of 65% in her index finger and 60% better in the headaches he had a relief from Friday all the way to Sunday so she was very happy.     Initial Intake    Patient reports fee

## 2019-10-04 ENCOUNTER — OFFICE VISIT (OUTPATIENT)
Dept: INTEGRATIVE MEDICINE | Facility: CLINIC | Age: 74
End: 2019-10-04

## 2019-10-04 DIAGNOSIS — M54.2 NECK PAIN: ICD-10-CM

## 2019-10-04 NOTE — PROGRESS NOTES
Halliewilmer Julio   Integrative Medicine       Patient has been having a relief of 70% in her index finger and 70% better in the headaches he had a relief from Friday all the way to Sunday so she was very happy.     Initial Intake    Patient reports fee

## 2019-10-11 ENCOUNTER — OFFICE VISIT (OUTPATIENT)
Dept: INTEGRATIVE MEDICINE | Facility: CLINIC | Age: 74
End: 2019-10-11

## 2019-10-11 DIAGNOSIS — M54.2 NECK PAIN: ICD-10-CM

## 2019-10-11 NOTE — PROGRESS NOTES
Hakan Grandchild, 1914 [address] has been having a relief of 70% in her index finger and had for two days this weekend a headache in the right side after working in the garden.     Initial Intake    Patient reports feeling persiste

## 2019-10-25 ENCOUNTER — OFFICE VISIT (OUTPATIENT)
Dept: INTEGRATIVE MEDICINE | Facility: CLINIC | Age: 74
End: 2019-10-25

## 2019-10-25 DIAGNOSIS — M54.2 NECK PAIN: ICD-10-CM

## 2019-10-25 NOTE — PROGRESS NOTES
Lynne Valenzuela, 1914 [address] has been having a relief of 75% in her index finger and had for two days this weekend a headache in the right side after working in the garden.     Initial Intake    Patient reports feeling persiste

## 2019-11-08 ENCOUNTER — OFFICE VISIT (OUTPATIENT)
Dept: INTEGRATIVE MEDICINE | Facility: CLINIC | Age: 74
End: 2019-11-08

## 2019-11-08 DIAGNOSIS — M54.2 NECK PAIN: ICD-10-CM

## 2019-11-11 NOTE — PROGRESS NOTES
Yair Ness, 1914 [address] has been having a relief of 77% in her index finger and had for two days this weekend a headache in the right side after working in the garden.     Initial Intake    Patient reports feeling persiste

## 2019-11-16 ENCOUNTER — PATIENT MESSAGE (OUTPATIENT)
Dept: PAIN CLINIC | Facility: CLINIC | Age: 74
End: 2019-11-16

## 2019-11-20 ENCOUNTER — OFFICE VISIT (OUTPATIENT)
Dept: INTEGRATIVE MEDICINE | Facility: CLINIC | Age: 74
End: 2019-11-20

## 2019-11-20 DIAGNOSIS — G89.29 CHRONIC NONINTRACTABLE HEADACHE, UNSPECIFIED HEADACHE TYPE: ICD-10-CM

## 2019-11-20 DIAGNOSIS — R51.9 CHRONIC NONINTRACTABLE HEADACHE, UNSPECIFIED HEADACHE TYPE: ICD-10-CM

## 2019-11-22 ENCOUNTER — OFFICE VISIT (OUTPATIENT)
Dept: INTEGRATIVE MEDICINE | Facility: CLINIC | Age: 74
End: 2019-11-22

## 2019-11-22 DIAGNOSIS — G89.29 CHRONIC NONINTRACTABLE HEADACHE, UNSPECIFIED HEADACHE TYPE: ICD-10-CM

## 2019-11-22 DIAGNOSIS — R51.9 CHRONIC NONINTRACTABLE HEADACHE, UNSPECIFIED HEADACHE TYPE: ICD-10-CM

## 2019-11-25 NOTE — PROGRESS NOTES
Lily Whipple, 1914 [facility] reports to have diminished over 60% the intensity and frequency of the headaches.     PAST  Patient has been having a relief of 77% in her index finger and had for two days this weekend a headache in

## 2019-11-27 ENCOUNTER — OFFICE VISIT (OUTPATIENT)
Dept: INTEGRATIVE MEDICINE | Facility: CLINIC | Age: 74
End: 2019-11-27

## 2019-11-27 DIAGNOSIS — R51.9 CHRONIC NONINTRACTABLE HEADACHE, UNSPECIFIED HEADACHE TYPE: ICD-10-CM

## 2019-11-27 DIAGNOSIS — G89.29 CHRONIC NONINTRACTABLE HEADACHE, UNSPECIFIED HEADACHE TYPE: ICD-10-CM

## 2019-11-27 NOTE — PROGRESS NOTES
Yair Ness, 1914 [address] reports to have diminished over 70% the intensity and frequency of the headaches.     PAST  Patient has been having a relief of 77% in her index finger and had for two days this weekend a headache in

## 2019-12-18 ENCOUNTER — HOSPITAL (OUTPATIENT)
Dept: OTHER | Age: 74
End: 2019-12-18
Attending: OBSTETRICS & GYNECOLOGY

## 2020-01-06 RX ORDER — GABAPENTIN 300 MG/1
CAPSULE ORAL
Qty: 60 CAPSULE | Refills: 0 | OUTPATIENT
Start: 2020-01-06

## 2020-02-14 ENCOUNTER — TELEPHONE (OUTPATIENT)
Dept: SURGERY | Facility: CLINIC | Age: 75
End: 2020-02-14

## 2020-02-14 NOTE — TELEPHONE ENCOUNTER
Rcvd fax from [facility] Initial Examination OV 2/12/20. Endorsed to provider for signature.

## 2020-02-18 ENCOUNTER — TELEPHONE (OUTPATIENT)
Dept: SURGERY | Facility: CLINIC | Age: 75
End: 2020-02-18

## 2020-02-18 NOTE — TELEPHONE ENCOUNTER
Rivervd signed fax back, Fax sent to [facility] PT at [phone]. Confirmation rcnancy.

## 2020-02-18 NOTE — TELEPHONE ENCOUNTER
Rcvd fax from [facility] PT Initial Examination from 2/12/20. Endorsed to provider for signature.

## 2020-03-05 ENCOUNTER — TELEPHONE (OUTPATIENT)
Dept: SURGERY | Facility: CLINIC | Age: 75
End: 2020-03-05

## 2020-03-06 NOTE — TELEPHONE ENCOUNTER
Carlton signed PT initial examination from [address] [place] [address] Rd 2/12/20. Faxed to [phone]. Confirmation carlton.

## 2020-03-11 PROCEDURE — 88305 TISSUE EXAM BY PATHOLOGIST: CPT | Performed by: INTERNAL MEDICINE

## 2020-05-13 ENCOUNTER — TELEPHONE (OUTPATIENT)
Dept: SURGERY | Facility: CLINIC | Age: 75
End: 2020-05-13

## 2020-05-19 ENCOUNTER — HOSPITAL ENCOUNTER (OUTPATIENT)
Dept: GENERAL RADIOLOGY | Age: 75
Discharge: HOME | End: 2020-05-19
Attending: OBSTETRICS & GYNECOLOGY

## 2020-05-19 DIAGNOSIS — M85.9 DISORDER OF BONE DENSITY AND STRUCTURE, UNSPECIFIED: ICD-10-CM

## 2020-05-19 PROCEDURE — 77080 DXA BONE DENSITY AXIAL: CPT

## 2020-06-05 ENCOUNTER — TELEPHONE (OUTPATIENT)
Dept: SURGERY | Facility: CLINIC | Age: 75
End: 2020-06-05

## 2020-07-27 ENCOUNTER — TELEPHONE (OUTPATIENT)
Dept: SURGERY | Facility: CLINIC | Age: 75
End: 2020-07-27

## 2020-08-20 ENCOUNTER — TELEPHONE (OUTPATIENT)
Dept: SURGERY | Facility: CLINIC | Age: 75
End: 2020-08-20

## 2021-12-13 ENCOUNTER — HOSPITAL ENCOUNTER (OUTPATIENT)
Dept: MAMMOGRAPHY | Age: 76
Discharge: HOME | End: 2021-12-13
Attending: OBSTETRICS & GYNECOLOGY

## 2021-12-13 DIAGNOSIS — Z12.31 ENCOUNTER FOR SCREENING MAMMOGRAM FOR MALIGNANT NEOPLASM OF BREAST: ICD-10-CM

## 2021-12-13 PROCEDURE — 77063 BREAST TOMOSYNTHESIS BI: CPT

## 2022-01-08 ENCOUNTER — LAB ENCOUNTER (OUTPATIENT)
Dept: LAB | Facility: HOSPITAL | Age: 77
End: 2022-01-08
Attending: INTERNAL MEDICINE
Payer: MEDICARE

## 2022-01-08 DIAGNOSIS — R19.7 DIARRHEA, UNSPECIFIED TYPE: ICD-10-CM

## 2022-01-08 LAB
CRYPTOSP AG STL QL IA: NEGATIVE
G LAMBLIA AG STL QL IA: NEGATIVE

## 2022-01-08 PROCEDURE — 87209 SMEAR COMPLEX STAIN: CPT

## 2022-01-08 PROCEDURE — 87493 C DIFF AMPLIFIED PROBE: CPT

## 2022-01-08 PROCEDURE — 87272 CRYPTOSPORIDIUM AG IF: CPT

## 2022-01-08 PROCEDURE — 87045 FECES CULTURE AEROBIC BACT: CPT

## 2022-01-08 PROCEDURE — 87046 STOOL CULTR AEROBIC BACT EA: CPT

## 2022-01-08 PROCEDURE — 87177 OVA AND PARASITES SMEARS: CPT

## 2022-01-08 PROCEDURE — 87329 GIARDIA AG IA: CPT

## 2022-01-09 NOTE — PROGRESS NOTES
Stool O&P is negative. Cultures are pending. C diff not done due to stool being formed. Pending the results, I will have further recommendations. She is also due for MRI pancreas to follow-up on pancreatic cysts. Her last MRI was in March, 2020.

## 2022-01-11 NOTE — PROGRESS NOTES
Stool studies are all normal. I asked her to update her symptoms since he has a history of recurrent diarrhea.

## 2022-01-12 LAB — OVA AND PARASITE, FECAL INTERPRETATION: NEGATIVE

## 2022-05-26 ENCOUNTER — LAB ENCOUNTER (OUTPATIENT)
Dept: LAB | Facility: HOSPITAL | Age: 77
End: 2022-05-26
Attending: INTERNAL MEDICINE
Payer: MEDICARE

## 2022-05-26 DIAGNOSIS — E03.9 HYPOTHYROIDISM (ACQUIRED): Primary | ICD-10-CM

## 2022-05-26 DIAGNOSIS — R79.89 ELEVATED SERUM CREATININE: ICD-10-CM

## 2022-05-26 LAB
ANION GAP SERPL CALC-SCNC: 4 MMOL/L (ref 0–18)
BUN BLD-MCNC: 23 MG/DL (ref 7–18)
BUN/CREAT SERPL: 18 (ref 10–20)
CALCIUM BLD-MCNC: 9.2 MG/DL (ref 8.5–10.1)
CHLORIDE SERPL-SCNC: 109 MMOL/L (ref 98–112)
CO2 SERPL-SCNC: 29 MMOL/L (ref 21–32)
CREAT BLD-MCNC: 1.28 MG/DL
FASTING STATUS PATIENT QL REPORTED: NO
GLUCOSE BLD-MCNC: 86 MG/DL (ref 70–99)
OSMOLALITY SERPL CALC.SUM OF ELEC: 297 MOSM/KG (ref 275–295)
POTASSIUM SERPL-SCNC: 3.6 MMOL/L (ref 3.5–5.1)
SODIUM SERPL-SCNC: 142 MMOL/L (ref 136–145)
TSI SER-ACNC: 2.66 MIU/ML (ref 0.36–3.74)

## 2022-05-26 PROCEDURE — 36415 COLL VENOUS BLD VENIPUNCTURE: CPT

## 2022-05-26 PROCEDURE — 80048 BASIC METABOLIC PNL TOTAL CA: CPT

## 2022-05-26 PROCEDURE — 84443 ASSAY THYROID STIM HORMONE: CPT

## 2023-04-13 ENCOUNTER — APPOINTMENT (OUTPATIENT)
Dept: GENERAL RADIOLOGY | Facility: HOSPITAL | Age: 78
End: 2023-04-13
Attending: EMERGENCY MEDICINE
Payer: MEDICARE

## 2023-04-13 ENCOUNTER — APPOINTMENT (OUTPATIENT)
Dept: CT IMAGING | Facility: HOSPITAL | Age: 78
End: 2023-04-13
Attending: EMERGENCY MEDICINE
Payer: MEDICARE

## 2023-04-13 ENCOUNTER — HOSPITAL ENCOUNTER (EMERGENCY)
Facility: HOSPITAL | Age: 78
Discharge: HOME | End: 2023-04-13
Attending: EMERGENCY MEDICINE
Payer: MEDICARE

## 2023-04-13 VITALS
SYSTOLIC BLOOD PRESSURE: 170 MMHG | HEART RATE: 58 BPM | DIASTOLIC BLOOD PRESSURE: 85 MMHG | TEMPERATURE: 97 F | RESPIRATION RATE: 20 BRPM | OXYGEN SATURATION: 100 %

## 2023-04-13 DIAGNOSIS — S32.030A COMPRESSION FRACTURE OF L3 VERTEBRA, INITIAL ENCOUNTER (HCC): Primary | ICD-10-CM

## 2023-04-13 PROCEDURE — 99285 EMERGENCY DEPT VISIT HI MDM: CPT

## 2023-04-13 PROCEDURE — 72100 X-RAY EXAM L-S SPINE 2/3 VWS: CPT | Performed by: EMERGENCY MEDICINE

## 2023-04-13 PROCEDURE — 70450 CT HEAD/BRAIN W/O DYE: CPT | Performed by: EMERGENCY MEDICINE

## 2023-04-13 PROCEDURE — 72125 CT NECK SPINE W/O DYE: CPT | Performed by: EMERGENCY MEDICINE

## 2023-04-13 PROCEDURE — 96376 TX/PRO/DX INJ SAME DRUG ADON: CPT

## 2023-04-13 PROCEDURE — 72072 X-RAY EXAM THORAC SPINE 3VWS: CPT | Performed by: EMERGENCY MEDICINE

## 2023-04-13 PROCEDURE — L0637 LSO SC R ANT/POS PNL PRE CST: HCPCS

## 2023-04-13 PROCEDURE — 96374 THER/PROPH/DIAG INJ IV PUSH: CPT

## 2023-04-13 PROCEDURE — 72220 X-RAY EXAM SACRUM TAILBONE: CPT | Performed by: EMERGENCY MEDICINE

## 2023-04-13 RX ORDER — LIDOCAINE 50 MG/G
2 PATCH TOPICAL EVERY 24 HOURS
Qty: 10 PATCH | Refills: 0 | Status: SHIPPED | OUTPATIENT
Start: 2023-04-13

## 2023-04-13 RX ORDER — VALACYCLOVIR HYDROCHLORIDE 500 MG/1
500 TABLET, FILM COATED ORAL 2 TIMES DAILY
Qty: 6 TABLET | Refills: 0 | Status: SHIPPED | OUTPATIENT
Start: 2023-04-13 | End: 2023-04-16

## 2023-04-13 RX ORDER — HYDROCODONE BITARTRATE AND ACETAMINOPHEN 5; 325 MG/1; MG/1
1-2 TABLET ORAL EVERY 6 HOURS PRN
Qty: 15 TABLET | Refills: 0 | Status: SHIPPED | OUTPATIENT
Start: 2023-04-13 | End: 2023-04-18

## 2023-04-13 RX ORDER — MORPHINE SULFATE 4 MG/ML
4 INJECTION, SOLUTION INTRAMUSCULAR; INTRAVENOUS ONCE
Status: COMPLETED | OUTPATIENT
Start: 2023-04-13 | End: 2023-04-13

## 2023-04-13 RX ORDER — HYDROCODONE BITARTRATE AND ACETAMINOPHEN 5; 325 MG/1; MG/1
1 TABLET ORAL ONCE
Status: COMPLETED | OUTPATIENT
Start: 2023-04-13 | End: 2023-04-13

## 2023-04-13 RX ORDER — HYDROCODONE BITARTRATE AND ACETAMINOPHEN 5; 325 MG/1; MG/1
TABLET ORAL
Status: COMPLETED
Start: 2023-04-13 | End: 2023-04-13

## 2023-04-13 NOTE — CM/SW NOTE
Kristine Beal from [employer] called requesting diagnosis for pt due to the norco RX.     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

## 2023-04-13 NOTE — ED INITIAL ASSESSMENT (HPI)
Pt here via EMS for fall from home. Pt was gardening and standing on a step stool, loss of balance and fell on the dirt. Pt landed on coccyx, denies hitting head or on blood thinners. Pt is A/O x4, arrived in a c-collar. Pain is in middle back and up to shoulder blades when pt moves shoulders.

## 2023-04-14 ENCOUNTER — PATIENT OUTREACH (OUTPATIENT)
Dept: CASE MANAGEMENT | Age: 78
End: 2023-04-14

## 2023-04-14 NOTE — PROGRESS NOTES
VM received; pt requesting assistance w/scheduling apt (dc 04/13) - pt would like to get in today because she needs more meds    Dr Jennifer Prince  [address] [facility]

## 2023-05-03 DIAGNOSIS — Z12.31 ENCOUNTER FOR SCREENING MAMMOGRAM FOR MALIGNANT NEOPLASM OF BREAST: Primary | ICD-10-CM

## 2023-05-03 DIAGNOSIS — Z78.0 ASYMPTOMATIC MENOPAUSAL STATE: ICD-10-CM

## 2023-05-08 ENCOUNTER — HOSPITAL ENCOUNTER (OUTPATIENT)
Dept: CT IMAGING | Age: 78
Discharge: HOME | End: 2023-05-08
Attending: OBSTETRICS & GYNECOLOGY

## 2023-05-08 ENCOUNTER — HOSPITAL ENCOUNTER (OUTPATIENT)
Dept: GENERAL RADIOLOGY | Age: 78
Discharge: HOME | End: 2023-05-08
Attending: OBSTETRICS & GYNECOLOGY

## 2023-05-08 DIAGNOSIS — Z78.0 ASYMPTOMATIC MENOPAUSAL STATE: ICD-10-CM

## 2023-05-08 DIAGNOSIS — Z12.31 ENCOUNTER FOR SCREENING MAMMOGRAM FOR MALIGNANT NEOPLASM OF BREAST: ICD-10-CM

## 2023-05-08 PROCEDURE — 77063 BREAST TOMOSYNTHESIS BI: CPT

## 2023-05-08 PROCEDURE — 77080 DXA BONE DENSITY AXIAL: CPT

## 2024-04-24 ENCOUNTER — LAB REQUISITION (OUTPATIENT)
Dept: OBGYN | Age: 79
End: 2024-04-24

## 2024-04-24 DIAGNOSIS — Z12.4 ENCOUNTER FOR SCREENING FOR MALIGNANT NEOPLASM OF CERVIX: ICD-10-CM

## 2024-04-24 DIAGNOSIS — Z12.31 VISIT FOR SCREENING MAMMOGRAM: Primary | ICD-10-CM

## 2024-04-24 PROCEDURE — 87624 HPV HI-RISK TYP POOLED RSLT: CPT | Performed by: CLINICAL MEDICAL LABORATORY

## 2024-04-24 PROCEDURE — 88175 CYTOPATH C/V AUTO FLUID REDO: CPT | Performed by: CLINICAL MEDICAL LABORATORY

## 2024-05-04 LAB
CASE RPRT: NORMAL
CYTOLOGY CVX/VAG STUDY: NORMAL
CYTOLOGY CVX/VAG STUDY: NORMAL
HPV16+18+45 E6+E7MRNA CVX NAA+PROBE: NEGATIVE
Lab: NORMAL
PAP EDUCATIONAL NOTE: NORMAL
SPECIMEN ADEQUACY: NORMAL

## 2024-05-09 ENCOUNTER — HOSPITAL ENCOUNTER (OUTPATIENT)
Dept: CT IMAGING | Age: 79
Discharge: HOME | End: 2024-05-09
Attending: OBSTETRICS & GYNECOLOGY

## 2024-05-09 DIAGNOSIS — Z12.31 VISIT FOR SCREENING MAMMOGRAM: ICD-10-CM

## 2024-05-09 PROCEDURE — 77067 SCR MAMMO BI INCL CAD: CPT

## 2025-03-05 ENCOUNTER — APPOINTMENT (OUTPATIENT)
Dept: CT IMAGING | Facility: HOSPITAL | Age: 80
End: 2025-03-05
Attending: EMERGENCY MEDICINE
Payer: MEDICARE

## 2025-03-05 ENCOUNTER — APPOINTMENT (OUTPATIENT)
Dept: GENERAL RADIOLOGY | Facility: HOSPITAL | Age: 80
End: 2025-03-05
Attending: EMERGENCY MEDICINE
Payer: MEDICARE

## 2025-03-05 ENCOUNTER — HOSPITAL ENCOUNTER (EMERGENCY)
Facility: HOSPITAL | Age: 80
Discharge: HOME | End: 2025-03-05
Attending: EMERGENCY MEDICINE
Payer: MEDICARE

## 2025-03-05 VITALS
SYSTOLIC BLOOD PRESSURE: 121 MMHG | BODY MASS INDEX: 26.52 KG/M2 | OXYGEN SATURATION: 100 % | HEART RATE: 57 BPM | WEIGHT: 175 LBS | TEMPERATURE: 98 F | DIASTOLIC BLOOD PRESSURE: 80 MMHG | RESPIRATION RATE: 22 BRPM | HEIGHT: 68 IN

## 2025-03-05 DIAGNOSIS — S09.90XA INJURY OF HEAD, INITIAL ENCOUNTER: Primary | ICD-10-CM

## 2025-03-05 DIAGNOSIS — E86.0 DEHYDRATION: ICD-10-CM

## 2025-03-05 DIAGNOSIS — S70.01XA CONTUSION OF RIGHT HIP, INITIAL ENCOUNTER: ICD-10-CM

## 2025-03-05 DIAGNOSIS — S40.011A CONTUSION OF RIGHT SHOULDER, INITIAL ENCOUNTER: ICD-10-CM

## 2025-03-05 LAB
ALBUMIN SERPL-MCNC: 4.3 G/DL (ref 3.2–4.8)
ALBUMIN/GLOB SERPL: 1.7 {RATIO} (ref 1–2)
ALP LIVER SERPL-CCNC: 78 U/L
ALT SERPL-CCNC: 40 U/L
ANION GAP SERPL CALC-SCNC: 11 MMOL/L (ref 0–18)
AST SERPL-CCNC: 26 U/L (ref ?–34)
ATRIAL RATE: 78 BPM
BASOPHILS # BLD AUTO: 0.02 X10(3) UL (ref 0–0.2)
BASOPHILS NFR BLD AUTO: 0.3 %
BILIRUB SERPL-MCNC: 0.5 MG/DL (ref 0.2–1.1)
BUN BLD-MCNC: 31 MG/DL (ref 9–23)
CALCIUM BLD-MCNC: 9.1 MG/DL (ref 8.7–10.6)
CHLORIDE SERPL-SCNC: 112 MMOL/L (ref 98–112)
CO2 SERPL-SCNC: 20 MMOL/L (ref 21–32)
CREAT BLD-MCNC: 1.45 MG/DL
EGFRCR SERPLBLD CKD-EPI 2021: 37 ML/MIN/1.73M2 (ref 60–?)
EOSINOPHIL # BLD AUTO: 0.03 X10(3) UL (ref 0–0.7)
EOSINOPHIL NFR BLD AUTO: 0.5 %
ERYTHROCYTE [DISTWIDTH] IN BLOOD BY AUTOMATED COUNT: 15.4 %
FLUAV + FLUBV RNA SPEC NAA+PROBE: NEGATIVE
FLUAV + FLUBV RNA SPEC NAA+PROBE: NEGATIVE
GLOBULIN PLAS-MCNC: 2.5 G/DL (ref 2–3.5)
GLUCOSE BLD-MCNC: 146 MG/DL (ref 70–99)
HCT VFR BLD AUTO: 38.1 %
HGB BLD-MCNC: 12.8 G/DL
IMM GRANULOCYTES # BLD AUTO: 0.04 X10(3) UL (ref 0–1)
IMM GRANULOCYTES NFR BLD: 0.6 %
LYMPHOCYTES # BLD AUTO: 0.64 X10(3) UL (ref 1–4)
LYMPHOCYTES NFR BLD AUTO: 9.6 %
MCH RBC QN AUTO: 29.4 PG (ref 26–34)
MCHC RBC AUTO-ENTMCNC: 33.6 G/DL (ref 31–37)
MCV RBC AUTO: 87.6 FL
MONOCYTES # BLD AUTO: 0.44 X10(3) UL (ref 0.1–1)
MONOCYTES NFR BLD AUTO: 6.6 %
NEUTROPHILS # BLD AUTO: 5.48 X10 (3) UL (ref 1.5–7.7)
NEUTROPHILS # BLD AUTO: 5.48 X10(3) UL (ref 1.5–7.7)
NEUTROPHILS NFR BLD AUTO: 82.4 %
OSMOLALITY SERPL CALC.SUM OF ELEC: 305 MOSM/KG (ref 275–295)
P AXIS: 56 DEGREES
P-R INTERVAL: 170 MS
PLATELET # BLD AUTO: 212 10(3)UL (ref 150–450)
POTASSIUM SERPL-SCNC: 3.9 MMOL/L (ref 3.5–5.1)
PROT SERPL-MCNC: 6.8 G/DL (ref 5.7–8.2)
Q-T INTERVAL: 420 MS
QRS DURATION: 82 MS
QTC CALCULATION (BEZET): 478 MS
R AXIS: 5 DEGREES
RBC # BLD AUTO: 4.35 X10(6)UL
RSV RNA SPEC NAA+PROBE: NEGATIVE
SARS-COV-2 RNA RESP QL NAA+PROBE: NOT DETECTED
SODIUM SERPL-SCNC: 143 MMOL/L (ref 136–145)
T AXIS: 66 DEGREES
TROPONIN I SERPL HS-MCNC: 13 NG/L
VENTRICULAR RATE: 78 BPM
WBC # BLD AUTO: 6.7 X10(3) UL (ref 4–11)

## 2025-03-05 PROCEDURE — 80053 COMPREHEN METABOLIC PANEL: CPT | Performed by: EMERGENCY MEDICINE

## 2025-03-05 PROCEDURE — 85025 COMPLETE CBC W/AUTO DIFF WBC: CPT

## 2025-03-05 PROCEDURE — 72125 CT NECK SPINE W/O DYE: CPT | Performed by: EMERGENCY MEDICINE

## 2025-03-05 PROCEDURE — 80053 COMPREHEN METABOLIC PANEL: CPT

## 2025-03-05 PROCEDURE — 70450 CT HEAD/BRAIN W/O DYE: CPT | Performed by: EMERGENCY MEDICINE

## 2025-03-05 PROCEDURE — 93010 ELECTROCARDIOGRAM REPORT: CPT

## 2025-03-05 PROCEDURE — 93005 ELECTROCARDIOGRAM TRACING: CPT

## 2025-03-05 PROCEDURE — 96376 TX/PRO/DX INJ SAME DRUG ADON: CPT

## 2025-03-05 PROCEDURE — 84484 ASSAY OF TROPONIN QUANT: CPT | Performed by: EMERGENCY MEDICINE

## 2025-03-05 PROCEDURE — 96361 HYDRATE IV INFUSION ADD-ON: CPT

## 2025-03-05 PROCEDURE — 73502 X-RAY EXAM HIP UNI 2-3 VIEWS: CPT | Performed by: EMERGENCY MEDICINE

## 2025-03-05 PROCEDURE — 85025 COMPLETE CBC W/AUTO DIFF WBC: CPT | Performed by: EMERGENCY MEDICINE

## 2025-03-05 PROCEDURE — 99285 EMERGENCY DEPT VISIT HI MDM: CPT

## 2025-03-05 PROCEDURE — 96375 TX/PRO/DX INJ NEW DRUG ADDON: CPT

## 2025-03-05 PROCEDURE — 96374 THER/PROPH/DIAG INJ IV PUSH: CPT

## 2025-03-05 PROCEDURE — 0241U SARS-COV-2/FLU A AND B/RSV BY PCR (GENEXPERT): CPT | Performed by: EMERGENCY MEDICINE

## 2025-03-05 PROCEDURE — 73030 X-RAY EXAM OF SHOULDER: CPT | Performed by: EMERGENCY MEDICINE

## 2025-03-05 RX ORDER — ONDANSETRON 2 MG/ML
4 INJECTION INTRAMUSCULAR; INTRAVENOUS ONCE
Status: COMPLETED | OUTPATIENT
Start: 2025-03-05 | End: 2025-03-05

## 2025-03-05 RX ORDER — ONDANSETRON 4 MG/1
4 TABLET, ORALLY DISINTEGRATING ORAL EVERY 4 HOURS PRN
Qty: 10 TABLET | Refills: 0 | Status: SHIPPED | OUTPATIENT
Start: 2025-03-05 | End: 2025-03-12

## 2025-03-05 RX ORDER — LEVOTHYROXINE SODIUM 150 UG/1
150 TABLET ORAL
COMMUNITY

## 2025-03-05 RX ORDER — TRAMADOL HYDROCHLORIDE 50 MG/1
TABLET ORAL EVERY 6 HOURS PRN
Qty: 10 TABLET | Refills: 0 | Status: SHIPPED | OUTPATIENT
Start: 2025-03-05 | End: 2025-03-05

## 2025-03-05 RX ORDER — MECLIZINE HYDROCHLORIDE 25 MG/1
25 TABLET ORAL ONCE
Status: COMPLETED | OUTPATIENT
Start: 2025-03-05 | End: 2025-03-05

## 2025-03-05 RX ORDER — MORPHINE SULFATE 2 MG/ML
2 INJECTION, SOLUTION INTRAMUSCULAR; INTRAVENOUS ONCE
Status: COMPLETED | OUTPATIENT
Start: 2025-03-05 | End: 2025-03-05

## 2025-03-05 RX ORDER — BUPROPION HYDROCHLORIDE 150 MG/1
150 TABLET ORAL DAILY
COMMUNITY

## 2025-03-05 RX ORDER — HYDROCODONE BITARTRATE AND ACETAMINOPHEN 5; 325 MG/1; MG/1
1 TABLET ORAL EVERY 6 HOURS PRN
Qty: 10 TABLET | Refills: 0 | Status: SHIPPED | OUTPATIENT
Start: 2025-03-05 | End: 2025-03-10

## 2025-03-05 NOTE — DISCHARGE INSTRUCTIONS
Take Tylenol, Zofran.  Push fluids follow-up with your primary care physician return if any severe headache, nausea, vomiting, numbness follow-up with your primary MD for orthopedic referral    You were seen in the emergency room in a limited time.  There is a possibility that although we do not see any acute process at this present time that things can change with time.  Is therefore imperative that you follow-up with primary care physician for close follow-up.  If there is any significant progression of your pain  or other symptoms you to return immediately to the emergency room.        Hold your sertraline for 1 to 2 days if you are taking tramadol with it.  If you are just using Tylenol he can continue using sertraline.

## 2025-03-05 NOTE — ED PROVIDER NOTES
Patient Seen in: [facility] Emergency Department      History     Chief Complaint   Patient presents with    Fall    Dizziness     Stated Complaint: dizziness    Subjective:   HPI      This is a 79-year-old female who states that she is was nauseous.  The patient states that last night she started having a little bit of nausea and and little bit of diarrhea.  She has chronic dizziness which is not having new but she stated that because of the nausea, diarrhea.  Patient started vomiting.  She had no chest pain abdominal pain fevers with this.  She said her throat felt little dry she was not having pain in her throat.  She had no chest pain no shortness of breath no abdominal pain the patient started vomiting.  She did also have some loose stools.  The patient states she went to stand up because she was feeling a little nauseous and then tripped over a box and fell onto her right shoulder and right hip.  She is also states she did hit her head.  She does have some chronic left shoulder pain from a previous fall.  She has been hospitalized before for pain control at [facility].  The patient states she will when she stood up she got little lightheaded.  She was not having any numbness or weakness in her arms or trouble speaking.  This was a mechanical fall.  The patient denied any numbness or weakness at this present time she has pain in the right shoulder and right hip from the fall.  She does have some chronic left shoulder pain which she says she is getting injections for she did not fall onto the left shoulder and she has no new pain in the left shoulder.  The patient denies any fevers or chills.    Objective:     Past Medical History:    Depression    Hx of pneumonia due to Klebsiella pneumoniae    Parotid cyst              Past Surgical History:   Procedure Laterality Date    Colonoscopy N/A 3/11/2020    Procedure: COLONOSCOPY, POSSIBLE BIOPSY, POSSIBLE POLYPECTOMY 48700;  Surgeon: Florian Butt MD;   Location: [facility]    Knee replacement surgery Right 01/2021    Dr. Novak     Other      right wrist     Other      nose surgery    Other      Right parotid salivary gland removal    Tonsillectomy                  Social History     Socioeconomic History    Marital status:    Tobacco Use    Smoking status: Never    Smokeless tobacco: Never   Vaping Use    Vaping status: Never Used   Substance and Sexual Activity    Alcohol use: Yes     Alcohol/week: 0.0 standard drinks of alcohol     Comment: RARE    Drug use: No   Other Topics Concern    Caffeine Concern Yes     Comment: 4 cups daily    Exercise Yes     Comment: PT 3X     Social Drivers of Health      [facility]    Housing Stability                  Physical Exam     ED Triage Vitals [03/05/25 0629]   /62   Pulse 76   Resp 18   Temp 97.5 °F (36.4 °C)   Temp src Temporal   SpO2 98 %   O2 Device None (Room air)       Current Vitals:   Vital Signs  BP: 121/80  Pulse: 57  Resp: 22  Temp: 97.5 °F (36.4 °C)  Temp src: Temporal  MAP (mmHg): 90    Oxygen Therapy  SpO2: 100 %  O2 Device: None (Room air)        Physical Exam  General: .  Patient is a pleasant female there is no respiratory distress she does have some mild to moderate paraspinal neck tenderness mid cervical area.  Cranial nerves are grossly intact no facial asymmetry.  Or mucosas wet lips do look dry.  The patient is in no respiratory distress    HEENT: Atraumatic, conjunctiva are not pale.  There is no icterus.  Oral mucosa Is wet.  No facial trauma.  The neck is supple.    LUNGS: Clear to auscultation, there is no wheezing or retraction.  No crackles.    CV: Cardiovascular is regular without murmurs or rubs.    ABD: The abdomen is soft nondistended nontender.  There is no rebound.  There is no guarding.    EXT: There is good pulses bilaterally.  There is no calf tenderness.  There is no rash noted.  There is no  edema  The patient has some right shoulder tenderness on palpation.  She also has some mild right hip tenderness on palpation but otherwise normal range of motion of the hip.  Normal range of motion of the right shoulder.  No other bony tenderness noted to lower extremities she would not allow me to touch her left shoulder she says that she did not fall on the left shoulder and she does not want me to touch it as she has chronic pain in the left shoulder.  And did not fall on that shoulder.  NEURO: Alert and oriented x4.  Muscle strength and sensory exam is grossly normal.  And the patient is neurologically intact with no focal findings.      ED Course     Labs Reviewed   CBC WITH DIFFERENTIAL WITH PLATELET - Abnormal; Notable for the following components:       Result Value    Lymphocyte Absolute 0.64 (*)     All other components within normal limits   COMP METABOLIC PANEL (14) - Abnormal; Notable for the following components:    Glucose 146 (*)     CO2 20.0 (*)     BUN 31 (*)     Creatinine 1.45 (*)     Calculated Osmolality 305 (*)     eGFR-Cr 37 (*)     All other components within normal limits   TROPONIN I HIGH SENSITIVITY - Normal   SARS-COV-2/FLU A AND B/RSV BY PCR (GENEXPERT) - Normal    Narrative:     This test is intended for the qualitative detection and differentiation of SARS-CoV-2, influenza A, influenza B, and respiratory syncytial virus (RSV) viral RNA in nasopharyngeal or nares swabs from individuals suspected of respiratory viral infection consistent with COVID-19 by their healthcare provider. Signs and symptoms of respiratory viral infection due to SARS-CoV-2, influenza, and RSV can be similar.    Test performed using the Xpert Xpress SARS-CoV-2/FLU/RSV (real time RT-PCR)  assay on the GeneXpert instrument, [employer], [place] [address].   This test is being used under the Food and Drug Administration's Emergency Use Authorization.    The authorized Fact Sheet for Healthcare Providers for this  assay is available upon request from the laboratory.   RAINBOW DRAW BLUE             The patient was placed on monitors, IV was started, blood was drawn.  Workup was done to rule out an acute intracranial bleed, cervical fracture right shoulder fracture right hip fracture possible electrolyte imbalance.       MDM      The EKG shows normal sinus rhythm.  There is no acute ST elevations or ischemic findings.  The rest of the EKG including rate rhythm axis and intervals I agree with the EKG report . The rate is 78 there is no acute ST elevation nonspecific ST changes are noted    QRS duration is 82 and compared to an EKG from October 2016 there is no significant changes.    The patient's COVID, flu, RSV is negative troponin is negative, comprehensive shows findings of mild renal sufficiency.  But the patient's creatinine has previously been as high as 1.35 about 2 years ago patient was given IV fluids.  She was given Zofran, morphine.  CBC was grossly negative.      I personally reviewed the radiographs and my individual interpretation shows    No obvious fracture of the right shoulder, right hip.  No obvious fracture of the cervical spine degenerative findings of supine x-ray of this CT of the brain shows no bleed or mass.    Also reviewed official report and it shows    XR SHOULDER, COMPLETE (MIN 2 VIEWS), RIGHT (CPT=73030)    Result Date: 3/5/2025  PROCEDURE:  XR SHOULDER, COMPLETE (MIN 2 VIEWS), RIGHT (CPT=73030)  TECHNIQUE:  Multiple views were obtained.  COMPARISON:  None.  INDICATIONS:  dizziness, fall, shoulder pain  PATIENT STATED HISTORY: (As transcribed by Technologist)  Patient offered no additional history at this time.               CONCLUSION:  Negative for fracture or malalignment.  Moderate osteoarthritis of the glenohumeral joint with a combination of joint space loss and marginal spurring.  Mild AC joint arthropathy.  Normal coracoclavicular distance and subacromial interval.   LOCATION:  [id number]    Dictated by (CST): Jay Villela MD on 3/05/2025 at 9:41 AM     Finalized by (CST): Jay Villela MD on 3/05/2025 at 9:41 AM       XR HIP W OR WO PELVIS 2 OR 3 VIEWS, RIGHT (CPT=73502)    Result Date: 3/5/2025  PROCEDURE:  XR HIP W OR WO PELVIS 2 OR 3 VIEWS, RIGHT ( CPT=73502)  TECHNIQUE:  Unilateral 2 to 3 views of the hip and pelvis if performed.  COMPARISON:  None.  INDICATIONS:  dizziness, fall, hip pain  PATIENT STATED HISTORY: (As transcribed by Technologist)  Patient offered no additional history at this time.               CONCLUSION:  Negative for fracture or malalignment of the pelvis or hips.  Hip joint spaces are preserved bilaterally.  Obturator rings are intact.  Normal sacroiliac joints and pubic symphysis.   LOCATION:  [id number]   Dictated by (CST): Jay Villela MD on 3/05/2025 at 9:40 AM     Finalized by (CST): Jay Villela MD on 3/05/2025 at 9:41 AM       CT SPINE CERVICAL (CPT=72125)    Result Date: 3/5/2025  PROCEDURE:  CT SPINE CERVICAL (CPT=72125)  COMPARISON:  [facility], CT SPINE CERVICAL (CPT=72125), 4/13/2023, 3:08 PM.  INDICATIONS:  Fall with head and neck pain  TECHNIQUE:  Noncontrast CT scanning of the cervical spine is performed from the skull base through C7.  Multiplanar reconstructions are generated.  Dose reduction techniques were used. Dose information is transmitted to the ACR (American College of Radiology) NRDR (National Radiology Data Registry) which includes the Dose Index Registry.  PATIENT STATED HISTORY: (As transcribed by Technologist)  Fall.    FINDINGS:  No acute fracture or traumatic malalignment of the cervical spine.  Moderate disc height loss and endplate degenerative change noted at the C5-6 and C6-7 levels.  No high-grade central canal or neural foraminal stenosis.  Lateral masses are aligned.  Intact odontoid process with moderate degenerative arthrosis of the atlantodental interval.  No prevertebral edema.  No epidural or paraspinal collections.  Moderate bilateral  carotid calcification.  Lung apices are clear.            CONCLUSION:  No evidence of acute traumatic injury of the cervical spine.    LOCATION:  [id number]   Dictated by (CST): Jay Villela MD on 3/05/2025 at 7:47 AM     Finalized by (CST): Jay Villela MD on 3/05/2025 at 7:50 AM       CT BRAIN OR HEAD (CPT=70450)    Result Date: 3/5/2025  PROCEDURE:  CT BRAIN OR HEAD (69001)  COMPARISON:  [facility] , CT, CT BRAIN OR HEAD (65454), 4/13/2023, 3:08 PM.  INDICATIONS:  Fall with head and neck pain  TECHNIQUE:  Noncontrast CT scanning is performed through the brain. Dose reduction techniques were used. Dose information is transmitted to the ACR (American College of Radiology) NRDR (National Radiology Data Registry) which includes the Dose Index Registry.  PATIENT STATED HISTORY: (As transcribed by Technologist)  Dizziness, fall.    FINDINGS:   VENTRICLES/SULCI: Diffuse sulcal and ventricular prominence concordant with age.  No hydrocephalus. INTRACRANIAL:  Negative for intracranial hemorrhage, mass effect, or acute large vessel transcortical infarct.  Patchy periventricular white matter hypodensity most in keeping with chronic microvascular ischemia. SINUSES:           Paranasal sinuses and mastoid air cells are clear. SKULL:               No evidence for fracture or osseous abnormality. OTHER:               No scalp hematoma.            CONCLUSION:   1. Negative for acute intracranial process.    LOCATION:  [id number]   Dictated by (CST): Jay Villela MD on 3/05/2025 at 7:45 AM     Finalized by (CST): Jay Villela MD on 3/05/2025 at 7:47 AM      The patient's pain is primary with the right shoulder with movement.  And she is able to ambulate by herself.  She has had no episodes of vomiting she is got a liter of fluids she is tolerated p.o. fluids.  I did discuss with her that there could be ligamental injuries or some other injury not seen on her workup on her x-rays there may be other injury seen on her workup not seen on her  plain x-rays.  Recommend close follow-up with the orthopedic surgeon she does have a long history of some pain management and I discussed this with her I would try Tylenol but if worse she can get a short course of tramadol.  She does have some chronic renal sufficiency so I did not give her ibuprofen.  I recommended that she follow-up with her orthopedic surgeon, pain specialist she has increasing pain discomfort to return here.    I discussed with the patient that were seeing them  in a short period of time.  I discussed with them that there is always a possibility that things can change and a need reevaluation with their primary care physician as soon as possible.  I've also discussed with them that if the pain gets worse to return to the emergency room immediately.  Discussed with her push fluids close follow-up with her primary care physician, pain specialist, orthopedic surgeon  Medical Decision Making      Disposition and Plan     Clinical Impression:  1. Injury of head, initial encounter    2. Contusion of right shoulder, initial encounter    3. Contusion of right hip, initial encounter    4. Dehydration         Disposition:  Discharge  3/5/2025 10:10 am    Follow-up:  Leslye Redmond  [address] [place]   [facility] [address]  [phone]    Follow up in 2 day(s)      Leslye Redmond  [address] [place]   [facility] [address]  [phone]    Follow up      Baltazar Spence MD  [address]  [address] [address]  [facility] [address] [phone]    Follow up in 2 day(s)            Medications Prescribed:  Current Discharge Medication List        START taking these medications    Details   ondansetron 4 MG Oral Tablet Dispersible Take 1 tablet (4 mg total) by mouth every 4 (four) hours as needed for Nausea.  Qty: 10 tablet, Refills: 0      traMADol 50 MG Oral Tab Take 1-2 tablets ( mg total) by mouth every 6 (six) hours as needed for Pain.  Qty: 10 tablet, Refills: 0     Associated Diagnoses: Contusion of right hip, initial encounter                 Supplementary Documentation:

## 2025-03-05 NOTE — ED INITIAL ASSESSMENT (HPI)
Ongoing dizziness intermittent for months accompanied by falls when worse. Previous fallwiwth injury to L arm, dneies any head injury.

## 2025-03-05 NOTE — CM/SW NOTE
[facility] assistance requested.  Patient states she was recently admitted to [facility].  [employer] denied MITCHELL.  Patient presents today s/p fall at home.  Patient denies using her walker and/or cane.  Per chart review, nystagmus and bilateral shoulder pain are chronic.  Patient actively being treated for both.  Patient states the SW at [facility] indicated her insurance would pay for care giving.  Recommended patient call her insurance company to arrange.  [facility] to order outpatient vestibular therapy.  Patient declined home health services.

## 2025-03-20 DIAGNOSIS — M80.00XP OSTEOPOROSIS WITH CURRENT PATHOLOGICAL FRACTURE WITH MALUNION, UNSPECIFIED OSTEOPOROSIS TYPE, SUBSEQUENT ENCOUNTER: Primary | ICD-10-CM

## 2025-04-26 ENCOUNTER — APPOINTMENT (OUTPATIENT)
Dept: CT IMAGING | Facility: HOSPITAL | Age: 80
End: 2025-04-26
Attending: EMERGENCY MEDICINE
Payer: MEDICARE

## 2025-04-26 ENCOUNTER — HOSPITAL ENCOUNTER (EMERGENCY)
Facility: HOSPITAL | Age: 80
Discharge: HOME | End: 2025-04-26
Attending: EMERGENCY MEDICINE
Payer: MEDICARE

## 2025-04-26 VITALS
SYSTOLIC BLOOD PRESSURE: 139 MMHG | HEIGHT: 68 IN | HEART RATE: 76 BPM | WEIGHT: 175 LBS | OXYGEN SATURATION: 97 % | RESPIRATION RATE: 18 BRPM | TEMPERATURE: 98 F | DIASTOLIC BLOOD PRESSURE: 96 MMHG | BODY MASS INDEX: 26.52 KG/M2

## 2025-04-26 DIAGNOSIS — R10.31 ABDOMINAL PAIN, RIGHT LOWER QUADRANT: Primary | ICD-10-CM

## 2025-04-26 LAB
ALBUMIN SERPL-MCNC: 4.7 G/DL (ref 3.2–4.8)
ALBUMIN/GLOB SERPL: 1.7 {RATIO} (ref 1–2)
ALP LIVER SERPL-CCNC: 54 U/L (ref 55–142)
ALT SERPL-CCNC: 16 U/L (ref 10–49)
ANION GAP SERPL CALC-SCNC: 11 MMOL/L (ref 0–18)
AST SERPL-CCNC: 30 U/L (ref ?–34)
BASOPHILS # BLD AUTO: 0.06 X10(3) UL (ref 0–0.2)
BASOPHILS NFR BLD AUTO: 1.1 %
BILIRUB SERPL-MCNC: 1 MG/DL (ref 0.2–1.1)
BUN BLD-MCNC: 18 MG/DL (ref 9–23)
CALCIUM BLD-MCNC: 9.4 MG/DL (ref 8.7–10.6)
CHLORIDE SERPL-SCNC: 106 MMOL/L (ref 98–112)
CO2 SERPL-SCNC: 24 MMOL/L (ref 21–32)
CREAT BLD-MCNC: 1.45 MG/DL (ref 0.55–1.02)
EGFRCR SERPLBLD CKD-EPI 2021: 37 ML/MIN/1.73M2 (ref 60–?)
EOSINOPHIL # BLD AUTO: 0.04 X10(3) UL (ref 0–0.7)
EOSINOPHIL NFR BLD AUTO: 0.7 %
ERYTHROCYTE [DISTWIDTH] IN BLOOD BY AUTOMATED COUNT: 14.7 %
GLOBULIN PLAS-MCNC: 2.8 G/DL (ref 2–3.5)
GLUCOSE BLD-MCNC: 124 MG/DL (ref 70–99)
HCT VFR BLD AUTO: 40.4 % (ref 35–48)
HGB BLD-MCNC: 13.9 G/DL (ref 12–16)
IMM GRANULOCYTES # BLD AUTO: 0.02 X10(3) UL (ref 0–1)
IMM GRANULOCYTES NFR BLD: 0.4 %
LYMPHOCYTES # BLD AUTO: 1.06 X10(3) UL (ref 1–4)
LYMPHOCYTES NFR BLD AUTO: 19.2 %
MCH RBC QN AUTO: 29.5 PG (ref 26–34)
MCHC RBC AUTO-ENTMCNC: 34.4 G/DL (ref 31–37)
MCV RBC AUTO: 85.8 FL (ref 80–100)
MONOCYTES # BLD AUTO: 0.33 X10(3) UL (ref 0.1–1)
MONOCYTES NFR BLD AUTO: 6 %
NEUTROPHILS # BLD AUTO: 4 X10 (3) UL (ref 1.5–7.7)
NEUTROPHILS # BLD AUTO: 4 X10(3) UL (ref 1.5–7.7)
NEUTROPHILS NFR BLD AUTO: 72.6 %
OSMOLALITY SERPL CALC.SUM OF ELEC: 295 MOSM/KG (ref 275–295)
PLATELET # BLD AUTO: 197 10(3)UL (ref 150–450)
POTASSIUM SERPL-SCNC: 3.3 MMOL/L (ref 3.5–5.1)
PROT SERPL-MCNC: 7.5 G/DL (ref 5.7–8.2)
RBC # BLD AUTO: 4.71 X10(6)UL (ref 3.8–5.3)
SODIUM SERPL-SCNC: 141 MMOL/L (ref 136–145)
WBC # BLD AUTO: 5.5 X10(3) UL (ref 4–11)

## 2025-04-26 PROCEDURE — 85025 COMPLETE CBC W/AUTO DIFF WBC: CPT | Performed by: EMERGENCY MEDICINE

## 2025-04-26 PROCEDURE — 74177 CT ABD & PELVIS W/CONTRAST: CPT | Performed by: EMERGENCY MEDICINE

## 2025-04-26 PROCEDURE — 80053 COMPREHEN METABOLIC PANEL: CPT | Performed by: EMERGENCY MEDICINE

## 2025-04-26 PROCEDURE — 99284 EMERGENCY DEPT VISIT MOD MDM: CPT

## 2025-04-26 PROCEDURE — 36415 COLL VENOUS BLD VENIPUNCTURE: CPT

## 2025-04-26 PROCEDURE — 99285 EMERGENCY DEPT VISIT HI MDM: CPT

## 2025-04-26 RX ORDER — KETOROLAC TROMETHAMINE 15 MG/ML
15 INJECTION, SOLUTION INTRAMUSCULAR; INTRAVENOUS ONCE
Status: DISCONTINUED | OUTPATIENT
Start: 2025-04-26 | End: 2025-04-26

## 2025-04-26 NOTE — ED PROVIDER NOTES
Patient Seen in: [facility] Emergency Department      History     Chief Complaint   Patient presents with    Abdomen/Flank Pain     Stated Complaint: abd pain    Subjective:   HPI    79-year-old with a history of depression, parotid cyst, BPPV, MARC, chronic L3 compression fracture  8 days ago, patient had abdominal pain, cramping. Was evaluated, had a CT at a duly facility.  Today she had RLQ pain that was new. Has been constant throughout the day. Has been nauseated, no vomiting. No diarrhea. No fever. No urinary symptoms. Appetite has been a bit down as well.  She spoke with Dr. Butt her gastroenterologist who advised her to come to the ER for evaluation.  Records reviewed CT abdomen and pelvis from 4/22 notes no acute intra-abdominal findings, chronic interstitial abnormality in the lung bases  History of Present Illness               Objective:     Past Medical History:    Depression    Hx of pneumonia due to Klebsiella pneumoniae    Parotid cyst              Past Surgical History:   Procedure Laterality Date    Colonoscopy N/A 3/11/2020    Procedure: COLONOSCOPY, POSSIBLE BIOPSY, POSSIBLE POLYPECTOMY 40814;  Surgeon: Florian Butt MD;  Location: [facility]    Knee replacement surgery Right 01/2021    Dr. Novak     Other      right wrist     Other      nose surgery    Other      Right parotid salivary gland removal    Tonsillectomy                  Social History     Socioeconomic History    Marital status:    Tobacco Use    Smoking status: Never    Smokeless tobacco: Never   Vaping Use    Vaping status: Never Used   Substance and Sexual Activity    Alcohol use: Yes     Alcohol/week: 0.0 standard drinks of alcohol     Comment: RARE    Drug use: No   Other Topics Concern    Caffeine Concern Yes     Comment: 4 cups daily    Exercise Yes     Comment: PT 3X     Social Drivers of Health     Food Insecurity: Low Risk  (2/19/2025)    Received from [facility]    [place]  Insecurity     Have there been times that your food ran out, and you didn't have money to get more?: No     Are there times that you worry that this might happen?: No   Transportation Needs: Low Risk  (2/19/2025)    Received from [facility]    Transportation Needs     Do you have trouble getting transportation to medical appointments?: No     How do you normally get to and from your appointments?: Other   Housing Stability: Low Risk  (2/19/2025)    Received from [facility]    Housing Stability     Are you worried that your electric, gas, oil, or water might be shut off?: No     Are you concerned about having a safe and reliable place to live?: No                                Physical Exam     ED Triage Vitals   BP 04/26/25 1822 120/81   Pulse 04/26/25 1817 94   Resp 04/26/25 1817 18   Temp 04/26/25 1817 98.2 °F (36.8 °C)   Temp src --    SpO2 04/26/25 1817 96 %   O2 Device 04/26/25 1817 None (Room air)       Current Vitals:   Vital Signs  BP: (!) 139/96  Pulse: 76  Resp: 18  Temp: 98.2 °F (36.8 °C)  MAP (mmHg): (!) 108    Oxygen Therapy  SpO2: 97 %  O2 Device: None (Room air)        Physical Exam  General: Patient is awake, alert in no acute distress.   HEENT:  Sclera are not icteric.  Conjunctivae within normal limits.  Mucous members are moist.   Cardiovascular: Regular rate and rhythm, normal S1-S2.  Respiratory: Lungs are clear to auscultation bilaterally.   Abdomen: Soft, moderately tender in the right inguinal area and right lower abdomen, not over McBurney's point, nondistended.  No palpable hernia mass.  Skin: warm and dry, no diaphoresis  Physical Exam                ED Course     Labs Reviewed   COMP METABOLIC PANEL (14) - Abnormal; Notable for the following components:       Result Value    Glucose 124 (*)     Potassium 3.3 (*)     Creatinine 1.45 (*)     eGFR-Cr 37 (*)     Alkaline Phosphatase 54 (*)     All other components within normal limits   CBC WITH  DIFFERENTIAL WITH PLATELET     CT abdomen and pelvis: I personally reviewed the radiographs and my individual interpretation shows no appreciable hernia.  I also reviewed the official report which showed no acute process other than worsening L3 compression fracture.     Results                             MDM        Previous records reviewed as noted in HPI    Differential includes, but is not limited to, acute appendicitis, ruptured AAA, bowel perforation    Review of any laboratory testing: CBC normal.  CMP with mild hypokalemia.  Creatinine at baseline.     Review of any radiographic studies: CT without acute intra-abdominal or pelvic process    Shared decision making with the patient.  Patient not having back pain doubt any acute component to her L3 compression fracture.  Etiology of her right lower quadrant pain is unclear.  She did ask for referral to a new gastroenterologist as hers is retiring, she does not have a preference between the groups.    The administration of these medications were addressed : Tylenol                             Medical Decision Making      Disposition and Plan     Clinical Impression:  1. Abdominal pain, right lower quadrant         Disposition:  Discharge  4/26/2025  8:27 pm    Follow-up:  Genet Zhang MD  [address]  [place] [id number] [phone]    Follow up      Edgar Negro MD  [address]  [place] [address] [phone]    Follow up      Miguel Inman MD  [address]  [place] [address]  [phone]    Follow up            Medications Prescribed:  Current Discharge Medication List          Supplementary Documentation:

## 2025-04-26 NOTE — ED INITIAL ASSESSMENT (HPI)
Pt to ER w/ complaints of rt lower abd pain since this morning. Last week patient states she had mid abd pain & CT of abd on 4/22.

## 2025-04-27 NOTE — DISCHARGE INSTRUCTIONS
Return for new or worsening symptoms such as increased pain, fever, vomiting    Tylenol as needed for discomfort

## 2025-05-21 ENCOUNTER — HOSPITAL ENCOUNTER (OUTPATIENT)
Dept: GENERAL RADIOLOGY | Age: 80
Discharge: HOME | End: 2025-05-21
Attending: STUDENT IN AN ORGANIZED HEALTH CARE EDUCATION/TRAINING PROGRAM

## 2025-05-21 DIAGNOSIS — M80.00XP OSTEOPOROSIS WITH CURRENT PATHOLOGICAL FRACTURE WITH MALUNION, UNSPECIFIED OSTEOPOROSIS TYPE, SUBSEQUENT ENCOUNTER: ICD-10-CM

## 2025-05-21 LAB
DEXA FRACTURE RISK HIP: NORMAL
DEXA FRACTURE RISK MAJOR: NORMAL
DEXA LT FEMNECK TSCORE: -1.8
DEXA LT FEMNECK ZSCORE: 0.4
DEXA LT HIP FRAX: NORMAL
DEXA LT MAJOR OSTEO FRAX: NORMAL
DEXA LT TOTFEM TSCORE: -1.6
DEXA SPINE L1-L4 T-SCORE: -1.1
DEXA SPINE L1-L4 Z-SCORE: 1.5

## 2025-05-21 PROCEDURE — 77080 DXA BONE DENSITY AXIAL: CPT

## 2025-06-24 NOTE — PROGRESS NOTES
Carol Preciado, [address] reports to have diminished over 50% the intensity and frequency of the headaches.     PAST  Patient has been having a relief of 77% in her index finger and had for two days this weekend a headache in 2-3x/week

## (undated) DEVICE — BANDAID COVERLET 1X3

## (undated) DEVICE — NEEDLE SPINAL 22X3-1/2 BLK

## (undated) DEVICE — PAIN TRAY: Brand: MEDLINE INDUSTRIES, INC.

## (undated) DEVICE — GLOVE SURG SENSICARE SZ 6-1/2

## (undated) DEVICE — MARKER SKIN 2 TIP

## (undated) DEVICE — REMOVER DURAPREP 3M

## (undated) DEVICE — GLOVE SURG SENSICARE SZ 7

## (undated) DEVICE — CAP,BOUFFANT,SPUNBOND,BLUE,24": Brand: MEDLINE INDUSTRIES, INC.

## (undated) DEVICE — 3M™ TEGADERM™ TRANSPARENT FILM DRESSING, 1626W, 4 IN X 4-3/4 IN (10 CM X 12 CM), 50 EACH/CARTON, 4 CARTON/CASE: Brand: 3M™ TEGADERM™

## (undated) DEVICE — GLOVE SURG SENSICARE SZ 7-1/2

## (undated) NOTE — LETTER
Tresa White [address] [address]  [place], [address] [facility]    Consent for Operation  Date: __________________                                Time: _______________    1. I authorize the performance upon Lizette Bonilla the following operation:    2. procedure has been videotaped, the surgeon will obtain the original videotape. The hospital will not be responsible for storage or maintenance of this tape.   8. For the purpose of advancing medical education, I consent to the admittance of observers to the STATEMENTS REQUIRING INSERTION OR COMPLETION WERE FILLED IN.     Signature of Patient:   ___________________________    When the patient is a minor or mentally incompetent to give consent:  Signature of person authorized to consent for patient: ____________ supplements, and pills I can buy without a prescription (including street drugs/illegal medications). Failure to inform my anesthesiologist about these medicines may increase my risk of anesthetic complications. iv.  If I am allergic to anything or have ha Anesthesiologist Signature     Date   Time  I have discussed the procedure and information above with the patient (or patient’s representative) and answered their questions. The patient or their representative has agreed to have anesthesia services.     ___

## (undated) NOTE — ED AVS SNAPSHOT
Lizette Mariea Richards   MRN: [id number]    Department:  [facility] Emergency Department   Date of Visit:  9/5/2019           Disclosure     Insurance plans vary and the physician(s) referred by the ER may not be covered by your plan.  Please contact tell this physician (or your personal doctor if your instructions are to return to your personal doctor) about any new or lasting problems. The primary care or specialist physician will see patients referred from the [facility] Emergency Department.  Jm Gordon

## (undated) NOTE — LETTER
Dr. Anupam Pierre  [phone]    Date: 4/29/2019    Patient Name: Malik Peacock,11/29/1945      To Whom it may concern:     This letter has been written at the patient's request. The above patient is to be seen at the [facility]

## (undated) NOTE — LETTER
Tresa White [address] [address]  [place], [address] [facility]    Consent for Operation  Date: __________________                                Time: _______________    1.  I authorize the performance upon Lizette Puente Ban the following operation:  Proce procedure has been videotaped, the surgeon will obtain the original videotape. The hospital will not be responsible for storage or maintenance of this tape.   7. For the purpose of advancing medical education, I consent to the admittance of observers to the STATEMENTS REQUIRING INSERTION OR COMPLETION WERE FILLED IN.     Signature of Patient:   ___________________________    When the patient is a minor or mentally incompetent to give consent:  Signature of person authorized to consent for patient: ____________ supplements, and pills I can buy without a prescription (including street drugs/illegal medications). Failure to inform my anesthesiologist about these medicines may increase my risk of anesthetic complications. iv.  If I am allergic to anything or have ha Anesthesiologist Signature     Date   Time  I have discussed the procedure and information above with the patient (or patient’s representative) and answered their questions. The patient or their representative has agreed to have anesthesia services.     ___